# Patient Record
Sex: MALE | Race: WHITE | NOT HISPANIC OR LATINO | Employment: OTHER
[De-identification: names, ages, dates, MRNs, and addresses within clinical notes are randomized per-mention and may not be internally consistent; named-entity substitution may affect disease eponyms.]

---

## 2021-11-29 ENCOUNTER — TRANSCRIBE ORDERS (OUTPATIENT)
Dept: OTHER | Age: 71
End: 2021-11-29
Payer: MEDICARE

## 2021-11-29 DIAGNOSIS — L63.1 ALOPECIA UNIVERSALIS: Primary | ICD-10-CM

## 2021-12-12 ENCOUNTER — HEALTH MAINTENANCE LETTER (OUTPATIENT)
Age: 71
End: 2021-12-12

## 2022-02-26 NOTE — TELEPHONE ENCOUNTER
FUTURE VISIT INFORMATION      FUTURE VISIT INFORMATION:    Date: 5.24.22    Time: 3:30    Location: [facility]  REFERRAL INFORMATION:    Referring provider:  Dr. Dinorah Monzon    Referring providers clinic:  [facility] Dermatology    Reason for visit/diagnosis  Hair loss- total/ Referral received, not processed yet- Dr Nicolás [facility]/ Scheduled per Pt    RECORDS REQUESTED FROM:       Clinic name Comments Records Status   [facility] 2.22.22, 1.25.22, 1.4.22 + more with  Dr. Monzon CE

## 2022-05-24 ENCOUNTER — LAB (OUTPATIENT)
Dept: LAB | Facility: CLINIC | Age: 72
End: 2022-05-24

## 2022-05-24 ENCOUNTER — OFFICE VISIT (OUTPATIENT)
Dept: DERMATOLOGY | Facility: CLINIC | Age: 72
End: 2022-05-24
Payer: MEDICARE

## 2022-05-24 ENCOUNTER — PRE VISIT (OUTPATIENT)
Dept: DERMATOLOGY | Facility: CLINIC | Age: 72
End: 2022-05-24

## 2022-05-24 VITALS — HEART RATE: 74 BPM | DIASTOLIC BLOOD PRESSURE: 89 MMHG | SYSTOLIC BLOOD PRESSURE: 136 MMHG

## 2022-05-24 DIAGNOSIS — Z51.81 MEDICATION MONITORING ENCOUNTER: ICD-10-CM

## 2022-05-24 DIAGNOSIS — Z79.899 OTHER LONG TERM (CURRENT) DRUG THERAPY: ICD-10-CM

## 2022-05-24 DIAGNOSIS — Z11.59 ENCOUNTER FOR SCREENING FOR OTHER VIRAL DISEASES: ICD-10-CM

## 2022-05-24 DIAGNOSIS — L63.1 ALOPECIA UNIVERSALIS: ICD-10-CM

## 2022-05-24 DIAGNOSIS — L60.3 ONYCHODYSTROPHY: ICD-10-CM

## 2022-05-24 DIAGNOSIS — L63.1 ALOPECIA UNIVERSALIS: Primary | ICD-10-CM

## 2022-05-24 LAB
ALBUMIN SERPL-MCNC: 4.2 G/DL (ref 3.4–5)
ALBUMIN UR-MCNC: NEGATIVE MG/DL
ALP SERPL-CCNC: 91 U/L (ref 40–150)
ALT SERPL W P-5'-P-CCNC: 63 U/L (ref 0–70)
ANION GAP SERPL CALCULATED.3IONS-SCNC: 5 MMOL/L (ref 3–14)
APPEARANCE UR: ABNORMAL
AST SERPL W P-5'-P-CCNC: 36 U/L (ref 0–45)
BASOPHILS # BLD AUTO: 0.1 10E3/UL (ref 0–0.2)
BASOPHILS NFR BLD AUTO: 2 %
BILIRUB SERPL-MCNC: 0.4 MG/DL (ref 0.2–1.3)
BILIRUB UR QL STRIP: NEGATIVE
BUN SERPL-MCNC: 19 MG/DL (ref 7–30)
CALCIUM SERPL-MCNC: 9.7 MG/DL (ref 8.5–10.1)
CHLORIDE BLD-SCNC: 108 MMOL/L (ref 94–109)
CHOLEST SERPL-MCNC: 137 MG/DL
CO2 SERPL-SCNC: 30 MMOL/L (ref 20–32)
COLOR UR AUTO: YELLOW
CREAT SERPL-MCNC: 1.1 MG/DL (ref 0.66–1.25)
EOSINOPHIL # BLD AUTO: 0.6 10E3/UL (ref 0–0.7)
EOSINOPHIL NFR BLD AUTO: 9 %
ERYTHROCYTE [DISTWIDTH] IN BLOOD BY AUTOMATED COUNT: 12.6 % (ref 10–15)
FASTING STATUS PATIENT QL REPORTED: NO
GFR SERPL CREATININE-BSD FRML MDRD: 73 ML/MIN/1.73M2
GLUCOSE BLD-MCNC: 100 MG/DL (ref 70–99)
GLUCOSE UR STRIP-MCNC: NEGATIVE MG/DL
HCT VFR BLD AUTO: 50.3 % (ref 40–53)
HDLC SERPL-MCNC: 38 MG/DL
HGB BLD-MCNC: 16.9 G/DL (ref 13.3–17.7)
HGB UR QL STRIP: NEGATIVE
IMM GRANULOCYTES # BLD: 0 10E3/UL
IMM GRANULOCYTES NFR BLD: 0 %
KETONES UR STRIP-MCNC: 5 MG/DL
LDLC SERPL CALC-MCNC: 67 MG/DL
LEUKOCYTE ESTERASE UR QL STRIP: NEGATIVE
LYMPHOCYTES # BLD AUTO: 1.6 10E3/UL (ref 0.8–5.3)
LYMPHOCYTES NFR BLD AUTO: 24 %
MCH RBC QN AUTO: 30.7 PG (ref 26.5–33)
MCHC RBC AUTO-ENTMCNC: 33.6 G/DL (ref 31.5–36.5)
MCV RBC AUTO: 92 FL (ref 78–100)
MONOCYTES # BLD AUTO: 0.6 10E3/UL (ref 0–1.3)
MONOCYTES NFR BLD AUTO: 8 %
MUCOUS THREADS #/AREA URNS LPF: PRESENT /LPF
NEUTROPHILS # BLD AUTO: 3.8 10E3/UL (ref 1.6–8.3)
NEUTROPHILS NFR BLD AUTO: 57 %
NITRATE UR QL: NEGATIVE
NONHDLC SERPL-MCNC: 99 MG/DL
NRBC # BLD AUTO: 0 10E3/UL
NRBC BLD AUTO-RTO: 0 /100
PH UR STRIP: 5 [PH] (ref 5–7)
PLATELET # BLD AUTO: 277 10E3/UL (ref 150–450)
POTASSIUM BLD-SCNC: 4.5 MMOL/L (ref 3.4–5.3)
PROT SERPL-MCNC: 7.8 G/DL (ref 6.8–8.8)
RBC # BLD AUTO: 5.5 10E6/UL (ref 4.4–5.9)
RBC URINE: 1 /HPF
SODIUM SERPL-SCNC: 143 MMOL/L (ref 133–144)
SP GR UR STRIP: 1.02 (ref 1–1.03)
TRIGL SERPL-MCNC: 159 MG/DL
UROBILINOGEN UR STRIP-MCNC: 2 MG/DL
WBC # BLD AUTO: 6.7 10E3/UL (ref 4–11)
WBC URINE: 2 /HPF

## 2022-05-24 PROCEDURE — 86481 TB AG RESPONSE T-CELL SUSP: CPT | Performed by: DERMATOLOGY

## 2022-05-24 PROCEDURE — 85025 COMPLETE CBC W/AUTO DIFF WBC: CPT | Performed by: PATHOLOGY

## 2022-05-24 PROCEDURE — 86704 HEP B CORE ANTIBODY TOTAL: CPT | Performed by: DERMATOLOGY

## 2022-05-24 PROCEDURE — 80053 COMPREHEN METABOLIC PANEL: CPT | Performed by: PATHOLOGY

## 2022-05-24 PROCEDURE — 81001 URINALYSIS AUTO W/SCOPE: CPT | Performed by: PATHOLOGY

## 2022-05-24 PROCEDURE — 86706 HEP B SURFACE ANTIBODY: CPT | Performed by: DERMATOLOGY

## 2022-05-24 PROCEDURE — 99204 OFFICE O/P NEW MOD 45 MIN: CPT | Mod: GC | Performed by: DERMATOLOGY

## 2022-05-24 PROCEDURE — 80061 LIPID PANEL: CPT | Performed by: PATHOLOGY

## 2022-05-24 PROCEDURE — 87389 HIV-1 AG W/HIV-1&-2 AB AG IA: CPT | Performed by: DERMATOLOGY

## 2022-05-24 PROCEDURE — 86803 HEPATITIS C AB TEST: CPT | Performed by: DERMATOLOGY

## 2022-05-24 PROCEDURE — 87340 HEPATITIS B SURFACE AG IA: CPT | Performed by: DERMATOLOGY

## 2022-05-24 PROCEDURE — 36415 COLL VENOUS BLD VENIPUNCTURE: CPT | Performed by: PATHOLOGY

## 2022-05-24 RX ORDER — ATORVASTATIN CALCIUM 40 MG/1
40 TABLET, FILM COATED ORAL DAILY
COMMUNITY
Start: 2022-05-18

## 2022-05-24 RX ORDER — LEVOTHYROXINE SODIUM 25 UG/1
25 TABLET ORAL DAILY
COMMUNITY
Start: 2022-03-29

## 2022-05-24 ASSESSMENT — PAIN SCALES - GENERAL: PAINLEVEL: NO PAIN (0)

## 2022-05-24 NOTE — NURSING NOTE
Dermatology Rooming Note    Jalen Garcia's goals for this visit include:   Chief Complaint   Patient presents with     Hair Loss     Redd is here for hair loss.    Alecia Moore, Visit Facilitator

## 2022-05-24 NOTE — LETTER
5/24/2022       RE: J[place]  [address] [address] [facility] [address]     Dear Colleague,    Thank you for referring your patient, Jalen Garcia, to the [facility] DERMATOLOG[facility] [place] [facility]. Please see a copy of my visit note below.    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 His clinical hx is consistent with alopecia universalis. Patient has failed treatment with squaric acid dibutylester contact sensitization therapy.  Discussed the pathophysiology and extensively explained the risks and benefits of oral treatment options and recommended considering a janus kinase inhibitor such as Xeljanz. Counseled that we would need to obtain labs prior to initiating this and that it may take 3-6 months to see improvement.  - Baseline labs ordered today  - Reviewed outside derm records from [facility] over the past 10 months  - Reviewed CBC diff, CMP, TSH, JOSEPH, RF, peripheral smear, ESR, CRP, CXR  - Plan to start oral Xeljanz once labs return normal. Advised pt to communicate with his PCP and other specialists to ensure on the same page     Procedures Performed:   None    Follow-up: 4 month(s) in-person, or earlier for  new or changing lesions    Staff and Scribe:       Rachana Saleem MD  PGY-5 Medicine-Dermatology  Pager [phone]      Scribe Disclosure:  I, Hanny Saini, am serving as a scribe to document services personally performed by Chanel Marie MD based on data collection and the provider's statements to me.     Provider Disclosure:   The documentation recorded by the scribe accurately reflects the services I personally performed and the decisions made by me.    Patient was seen and examined with the medicine/dermatology resident. I agree with the history, review of systems, physical examination, assessments and plan.    Chanel Marie MD  Professor and  Chair  Department of Dermatology  [facility]  ____________________________________________    CC: Hair Loss (Redd is here for hair loss. )    HPI:  Mr. Jalen Garcia is a 71 year old male who presents as a new patient for evaluation of hair loss.   - Seen at the request of: Dr. Nicolás Copeland  - Reason for referral: alopecia universalis     - Onset of hair loss: Aug 2021  - Affected areas: whole body  - Shedding or thinning, or both: shedding  - Percentage of hair loss: 100%  No Scalp pain   No Scalp burning   No Scalp itching    Yes - complete loss Eyebrow changes    Yes - complete loss Eyelash changes   Yes - complete loss Beard changes    Yes - loss of pubic, axillary, trunk, arm/leg hair Other body hair changes    Yes - onychodystrophy all 10 nails Nail changes    No Additional symptoms? (please list below)     - Current treatments: squaric acid sensitiziation (completed 17 treatments)  - Prior discontinued treatments: N/A  - Prior biopsies: N/A (records available: yes)  - Prior labs: reviewed, see below (records available: yes )  - Scalp or hair care habits/products: washes scalp daily  - - - Sunscreen use on face or scalp? If so, what brand? 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 Active smoker. He does report intermittent flushing of unclear etiology. Does not sweat. He has had painful crumbling of all his fingernails    Patient is otherwise feeling well, in usual state of health, and has no additional skin concerns today.     Labs:  CBC , CMP , Hepatic panel  and TSH reviewed.  CXR within normal limits from 3/2022  CBC within normal limits  ESR/CRP within normal limits  RF negative  UA negative  PSA within normal limits  JOSEPH within normal limits   Peripheral smear  Elevated TSH, on levothyroxine    Physical Exam:  Vitals: /89   Pulse 74   GEN: Well developed, well-nourished, in no acute distress, in a pleasant mood.    SKIN: Focused examination of the scalp and nails was performed.  - diffuse fine vellus hair fibers < 1 mm on scalp  - Scattered eyelash fibers  - No eyebrow fibers  - nail dystrophy 10/10 fingernails  - No scalp folliculitis/pustules   - No other lesions of concern on areas examined.     Medications:  Current Outpatient Medications   Medication     aspirin (ASA) 81 MG EC tablet     atorvastatin (LIPITOR) 40 MG tablet     cholecalciferol (VITAMIN D3) 25 mcg (1000 units) capsule     levothyroxine (SYNTHROID/LEVOTHROID) 25 MCG tablet     UNABLE TO FIND     No current facility-administered medications for this visit.      Past  Medical History:   There is no problem list on file for this patient.    No past medical history on file.    CC Referred Self, MD  No address on file on close of this encounter.        Again, thank you for allowing me to participate in the care of your patient.      Sincerely,    Chanel Marie MD

## 2022-05-24 NOTE — PROGRESS NOTES
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 His clinical hx is consistent with alopecia universalis. Patient has failed treatment with squaric acid dibutylester contact sensitization therapy.  Discussed the pathophysiology and extensively explained the risks and benefits of oral treatment options and recommended considering a janus kinase inhibitor such as Xeljanz. Counseled that we would need to obtain labs prior to initiating this and that it may take 3-6 months to see improvement.  - Baseline labs ordered today  - Reviewed outside derm records from  over the past 10 months  - Reviewed CBC diff, CMP, TSH, JOSEPH, RF, peripheral smear, ESR, CRP, CXR  - Plan to start oral Xeljanz once labs return normal. Advised pt to communicate with his PCP and other specialists to ensure on the same page     Procedures Performed:   None    Follow-up: 4 month(s) in-person, or earlier for new or changing lesions    Staff and Scribe:       Rachana Saleem MD  PGY-5 Medicine-Dermatology  Pager [phone]      Scribe Disclosure:  I, Hanny Saini, am serving as a scribe to document services personally performed by Chanel Marie MD based on data collection and the provider's statements to me.      Provider Disclosure:   The documentation recorded by the scribe accurately reflects the services I personally performed and the decisions made by me.    Patient was seen and examined with the medicine/dermatology resident. I agree with the history, review of systems, physical examination, assessments and plan.    Chanel Marie MD  Professor and  Chair  Department of Dermatology  [facility]  ____________________________________________    CC: Hair Loss (Redd is here for hair loss. )    HPI:  Mr. Jalen Garcia is a 71 year old male who presents as a new patient for evaluation of hair loss.   - Seen at the request of: Dr. Nicolás Copeland  - Reason for referral: alopecia universalis     - Onset of hair loss: Aug 2021  - Affected areas: whole body  - Shedding or thinning, or both: shedding  - Percentage of hair loss: 100%  No Scalp pain   No Scalp burning   No Scalp itching    Yes - complete loss Eyebrow changes    Yes - complete loss Eyelash changes   Yes - complete loss Beard changes    Yes - loss of pubic, axillary, trunk, arm/leg hair Other body hair changes    Yes - onychodystrophy all 10 nails Nail changes    No Additional symptoms? (please list below)     - Current treatments: squaric acid sensitiziation (completed 17 treatments)  - Prior discontinued treatments: N/A  - Prior biopsies: N/A (records available: yes)  - Prior labs: reviewed, see below (records available: yes )  - Scalp or hair care habits/products: washes scalp daily  - - - Sunscreen use on face or scalp? If so, what brand? 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 Active smoker. He does report intermittent flushing of unclear etiology. Does not sweat. He has had painful crumbling of all his fingernails    Patient is otherwise feeling well, in usual state of health, and has no additional skin concerns today.     Labs:  CBC , CMP , Hepatic panel  and TSH reviewed.  CXR within normal limits from 3/2022  CBC within normal limits  ESR/CRP within normal limits  RF negative  UA negative  PSA within normal limits  JOSEPH within normal limits   Peripheral smear  Elevated TSH, on levothyroxine    Physical Exam:  Vitals: /89   Pulse 74   GEN: Well developed, well-nourished, in no acute distress, in a pleasant mood.    SKIN: Focused examination of the scalp and nails was performed.  - diffuse fine vellus hair fibers < 1 mm on scalp  - Scattered eyelash fibers  - No eyebrow fibers  - nail dystrophy 10/10 fingernails  - No scalp folliculitis/pustules   - No other lesions of concern on areas examined.     Medications:  Current Outpatient Medications   Medication     aspirin (ASA) 81 MG EC tablet     atorvastatin (LIPITOR) 40 MG tablet     cholecalciferol (VITAMIN D3) 25 mcg (1000 units) capsule     levothyroxine (SYNTHROID/LEVOTHROID) 25 MCG tablet     UNABLE TO FIND     No current facility-administered medications for this visit.      Past Medical History:   There is no problem list on file for this patient.    No past medical history on file.    CC Referred Self, MD  No address on file on close of this encounter.

## 2022-05-25 LAB
HBV CORE AB SERPL QL IA: NONREACTIVE
HBV SURFACE AB SERPL IA-ACNC: 0.27 M[IU]/ML
HBV SURFACE AG SERPL QL IA: NONREACTIVE
HCV AB SERPL QL IA: NONREACTIVE
HIV 1+2 AB+HIV1 P24 AG SERPL QL IA: NONREACTIVE

## 2022-05-26 LAB
GAMMA INTERFERON BACKGROUND BLD IA-ACNC: 0.06 IU/ML
M TB IFN-G BLD-IMP: NEGATIVE
M TB IFN-G CD4+ BCKGRND COR BLD-ACNC: 9.94 IU/ML
MITOGEN IGNF BCKGRD COR BLD-ACNC: 0.02 IU/ML
MITOGEN IGNF BCKGRD COR BLD-ACNC: 0.02 IU/ML
QUANTIFERON MITOGEN: 10 IU/ML
QUANTIFERON NIL TUBE: 0.06 IU/ML
QUANTIFERON TB1 TUBE: 0.08 IU/ML
QUANTIFERON TB2 TUBE: 0.08

## 2022-06-13 ENCOUNTER — TELEPHONE (OUTPATIENT)
Dept: DERMATOLOGY | Facility: CLINIC | Age: 72
End: 2022-06-13

## 2022-06-13 NOTE — TELEPHONE ENCOUNTER
PA Initiation    Medication: Xeljanz  Insurance Company: [employer] - Phone [phone] Fax [phone]  Pharmacy Filling the Rx: [facility] - [place], [place] - [facility] [address]  Filling Pharmacy Phone:    Filling Pharmacy Fax:    Start Date: 6/13/2022

## 2022-06-15 NOTE — TELEPHONE ENCOUNTER
PRIOR AUTHORIZATION DENIED    Medication: Xeljanz    Denial Date: 6/13/2022    Denial Rational: Not approved diagnosis    Appeal Information: fax [phone]

## 2022-06-20 NOTE — TELEPHONE ENCOUNTER
Chanel Marie MD Devore, Kindra, [facility]; Veronica Kraft; [facility] Dermatology Adult [facility] 2 days ago     SIDNEY Martin, can we try xelsource?   Alexandra and team, can you print out the letter and will add to the pile of xelganz denials and will be ready to write if xelsource is denied.   Regards,   SIDNEY

## 2022-06-21 ENCOUNTER — TELEPHONE (OUTPATIENT)
Dept: DERMATOLOGY | Facility: CLINIC | Age: 72
End: 2022-06-21
Payer: MEDICARE

## 2022-06-28 NOTE — TELEPHONE ENCOUNTER
"Per Dr. Dodson: \"RESULTS ARE OK BUT THERE IS NO ANTIBODY TO HEP B AND TRIGLYCERIDES ARE ELEVATED - MR. MARTINEZ WAS RECOMMENDED TO SEE HIS PRIMARY CARE PROVIDER TO REVIEW THESE RESULTS AND NEXT STEPS PRIOR TO STARTING XELGANZ\"    Patient reports he had the Heb B vaccine 2 days after his visit with Dr. Marie and is on a medication for his triglycerides via PCP. He received a letter from insurance saying they will not cover Xelganz but I do not see any PA initiation from us. Will route to PA team to see if they have any ideas. Patient is wondering how much it would be to pay out of pocket. Routing to Dr. Marie to recommend alternatives if this is not covered.    Estrella Dos Santos, EMT   "

## 2022-06-28 NOTE — TELEPHONE ENCOUNTER
Medication Appeal Initiation    We have initiated an appeal for the requested medication:  Medication: Xeljanz  Appeal Start Date:  6/28/2022  Insurance Company: [employer] - Phone [phone] Fax [phone]  Comments:  Faxed [phone]

## 2022-06-28 NOTE — TELEPHONE ENCOUNTER
[facility] Call Center    Phone Message    May a detailed message be left on voicemail: yes     Reason for Call: Medication Question or concern regarding medication   Prescription Clarification  Name of Medication: Xeljanz  Prescribing Provider: Dr. Marie   Pharmacy: [facility] [address] [address]    What on the order needs clarification? Pt is still wait to hear about the Rx. If it is not getting covered please call to discuss out of pocket pay.   Thanks           Action Taken: Message routed to:  [facility] Derm    Travel Screening: Not Applicable

## 2022-06-28 NOTE — TELEPHONE ENCOUNTER
Veronica Kraft  [facility] Dermatology Adult [facility] 2 minutes ago (10:42 AM)     [place]    Please see documentation below. Appeal has been sent to plan today.     Thank you   Veronica Kraft   Pharmacy Liaison Dermatology   P: [phone]   F: [phone]   [email or web address]    Message text

## 2022-06-30 ENCOUNTER — MYC MEDICAL ADVICE (OUTPATIENT)
Dept: PHARMACY | Facility: OTHER | Age: 72
End: 2022-06-30

## 2022-06-30 NOTE — TELEPHONE ENCOUNTER
MEDICATION APPEAL DENIED    Medication: Xeljanz    Denial Date: 6/28/2022    Denial Rational: not covered indication    Second Level Appeal Information: [phone]

## 2022-06-30 NOTE — TELEPHONE ENCOUNTER
2nd level Medication Appeal Initiation    We have initiated an appeal for the requested medication:  Medication: Xeljanz  Appeal Start Date:  6/30/2022  Insurance Company: [employer] - Phone [phone] Fax [phone]  Comments:  Faxed [phone]

## 2022-07-15 NOTE — TELEPHONE ENCOUNTER
2nd level MEDICATION APPEAL DENIED    Medication: Xeljanz    Denial Date: 7/14/2022    Denial Rational: not covered indication

## 2022-07-15 NOTE — TELEPHONE ENCOUNTER
All appeal levels have been exhausted. Check with patient on free drug and he states he will not qualify. Please follow up with patient on next steps and discontinue medication. Closing encoutner

## 2022-07-18 NOTE — TELEPHONE ENCOUNTER
Called and left a message for the patient to return my call. Dr Marie want to know if he followed up with his PCP on hi elevated cholesterol and what steps his Doctor is taking to manage it.     Loki Hall, Paramedic

## 2022-07-18 NOTE — TELEPHONE ENCOUNTER
Spoke with Jamal, there was confusion about reaching out to his PCP about his triglycerides - he thought Dr. Marie was reaching out to the PCP. I explained that he needs to reach out to his PCP to discuss his elevated numbers. Jamal will reach out to his PCP and send us a MyChart message once this has been addressed.

## 2022-07-18 NOTE — TELEPHONE ENCOUNTER
Chanel Marie MD Flores, Marc; [facility] Dermatology Adult [facility] 2 days ago     SIDNEY Larry,   First, can you check if Rao followed up on the elevated triglycerides and other lab studies?  If yes, please find out what was recommended.     Second, we can order baracitinib and see if his insurance will cover this NATALIA inhibitor. [place] doing once #1 has been addressed.     Regards,   SIDNEY

## 2022-07-19 ENCOUNTER — MYC MEDICAL ADVICE (OUTPATIENT)
Dept: DERMATOLOGY | Facility: CLINIC | Age: 72
End: 2022-07-19

## 2022-07-24 DIAGNOSIS — L63.9 ALOPECIA AREATA: Primary | ICD-10-CM

## 2022-07-24 DIAGNOSIS — M35.9 AUTOIMMUNE DISEASE (H): ICD-10-CM

## 2022-07-24 NOTE — CONFIDENTIAL NOTE
Follow-up on prescribing baricitinib. Question about Rao's triglycerides has been answerd. Will go ahead and order baricitinib 4 mg daily. Follow-up visit pending at the [facility]. Should be in about 2 months.    Chanel Marie MD

## 2022-07-25 ENCOUNTER — TELEPHONE (OUTPATIENT)
Dept: DERMATOLOGY | Facility: CLINIC | Age: 72
End: 2022-07-25

## 2022-07-25 NOTE — TELEPHONE ENCOUNTER
Chanel Marie MD  You Yesterday (8:48 AM)     MH    This has been addressed in his chart. Fyi only       Message text

## 2022-07-25 NOTE — TELEPHONE ENCOUNTER
PA Initiation    Medication: Olumiant  Insurance Company: [employer] - Phone [phone] Fax [phone]  Pharmacy Filling the Rx: [facility] - [place], [place] - [facility] [address]  Filling Pharmacy Phone:    Filling Pharmacy Fax:    Start Date: 7/25/2022

## 2022-07-26 NOTE — TELEPHONE ENCOUNTER
Dr. Marie responded to patient on 7/24/2022. See 7/24/2022 MyChart Encounter from 7/24/2022 for more information. Appears that patient has no further questions. Closing this encounter.     Follow up pending at  site. Should be in about 2 months per MD. Patient on Dr. Marie's  waitlist.    PA for Olumiant in process at time of writing. See Telephone encounter from 7/25/2022 for more information.    José Luis Pablo, EMT

## 2022-07-27 NOTE — TELEPHONE ENCOUNTER
PRIOR AUTHORIZATION DENIED    Medication: Olumiant    Denial Date: 7/25/2022    Denial Rational: Drugs used for cosmetic purposes are excluded from part D coverage    Appeal Information: [phone]

## 2022-08-01 NOTE — TELEPHONE ENCOUNTER
Veronica, what is the next step  - do we do a second appeal? schedule a peer to peer? other?  Received: Yesterday  Chanel Marie MD Shutrop, Kayla; P [facility] Dermatology Adult [facility]  Caller: Unspecified (1 week ago,  9:15 AM)  Hi,   As in the subject line -   Please advise.   Regards,

## 2022-08-01 NOTE — TELEPHONE ENCOUNTER
Veronica Kraft, Chanel Wadsworth MD; [facility] Dermatology Adult [facility] 3 hours ago (9:33 AM)     [place]    Hello     We can appeal the decision, however would need a letter of medical necessity since insurance is denying because its being used for cosmetic purposes     Thank you   Veronica

## 2022-08-22 NOTE — TELEPHONE ENCOUNTER
Medication Appeal Initiation    We have initiated an appeal for the requested medication:  Medication: Olumiant  Appeal Start Date:  8/22/2022  Insurance Company: [employer] - Phone [phone] Fax [phone]  Comments:  Faxed [phone]

## 2022-08-24 NOTE — TELEPHONE ENCOUNTER
MEDICATION APPEAL DENIED    Medication: Olumiant    Denial Date: 7/25/2022    Denial Rational: denial upheld, drugs used for cosmetic purposes are excluded from part D coveage    Appeal Information: [facility]  [phone]

## 2022-08-24 NOTE — TELEPHONE ENCOUNTER
2nd Level Medication Appeal Initiation    We have initiated an appeal for the requested medication:  Medication: Olumiant  Appeal Start Date:  8/22/2022  Insurance Company: [employer] - Phone [phone] Fax [phone]  Comments:  Faxed [phone]

## 2022-08-26 NOTE — TELEPHONE ENCOUNTER
Per Hanny at [facility] the appeal decision is unfavorable and a written detailed letter will be faxed over.

## 2022-08-31 ENCOUNTER — TELEPHONE (OUTPATIENT)
Dept: DERMATOLOGY | Facility: CLINIC | Age: 72
End: 2022-08-31

## 2022-08-31 NOTE — TELEPHONE ENCOUNTER
Lvm letting pt know that  would like to touch base with him. Pt was scheduled for July of next year. Sent a message to schedule for this year in [place].  ----- Message from Chanel Marie MD sent at 8/28/2022  6:33 AM CDT -----  Regarding: RE: Appointment  HI,  Would be good to touch bases before winter. If an appointment is scheduled, would keep. Otherwise, could go out into October or November.  Regards,        ----- Message -----  From: Lashawn Jiemnes  Sent: 8/26/2022   3:33 PM CDT  To: Chanel Marie MD, #  Subject: Appointment                                      Hello ,    I called this patient to schedule him for MG. Pt stated that his insurance denied the medication prescribed so there was nothing done within the time he was out of the office with you to now.     He was wondering if you'd still need to see him in 4 months (in September). He would still like to see you in [place].     Please advise,  Lashawn Jimenes

## 2022-09-02 NOTE — TELEPHONE ENCOUNTER
MEDICATION APPEAL DENIED    Medication: Olumiant    Denial Date:  9/2/2022    Denial Rational: Spoke to [facility] the appeal was denied still because its being used for cosmetic purposes and will not be covered under part d    Second Level Appeal Information: Per insurance the only option you can try is medical billing but Part B does not cover this medication.    Second level appeals will be managed by the clinic staff and provider. Please contact the [employer] Prior Authorization Team if additional information about the denial is needed.

## 2022-09-06 NOTE — TELEPHONE ENCOUNTER
Spoke to pt.  Pt states that he does make too much money for a free drug program.  He mentioned he did do a program where he paid like 20% less by calling a pharmacy.  I did check "[employer] prices and they would charge $600 less then our cash price with [employer] $3,172.69 compared to $2,547.80so that could be an option as well.  Unfortunately, we did exhaust all our insurance based options to get it covered.

## 2022-09-19 NOTE — TELEPHONE ENCOUNTER
Chanel Marie MD  You; Ede Wheeler 23 hours ago (10:47 AM)     [facility],   I will be seeing him in follow0-up at the end of this month and will plan to catch up with him then in regards to next steps.   MH    Message

## 2022-09-22 NOTE — PROGRESS NOTES
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 Has been present for 13 months and started \"eveywhere\" . Patient denies pain but with mild itching.  Patient has a family history of AA and hypothyroidism, was recently diagnosed with thyroid disease himself.  Has failed treatment with squaric acid dibutylester contact sensitization therapy. Insurance declined janus kinase inhibitors. Counseled patient on alternative treatments including methotrexate, prednisone, low dose oral minoxidil, and ILK injections. Reviewed methotrexate -  will required blood safety testing and monitoring. Explained that ILK injections will require multiple treatments and will have no improvement of hair loss on eyelashes and eyebrows. Patient declined ILK injections as he wants to improved eyelashes and eyebrows as well. Counseled patient on the effects and risks of prednisone including change in blood pressure, bone, and eyes with long term use. Review on adding oral low dose minoxidil after a course of prednisone.   - Advised patient to communicate with his eye doctor and PCP for prednisone.   - Recommend a tapering course of prednisone:  40 mg daily x 7 days, then 30 mg x 7 days, then 20 mg x 7 days, then 10 mg daily. Discussed medication side effects including increased appetite, weight gain, edema, hypertension, elevated blood sugar levels, changes in mood, osteopenia/osteoporosis, Cushing's disease and risks associated with abruptly stopping the medication.Take medication with food and wait 30 minutes before laying down.    - Recommend Head&Shoulder shampoo for maintenance after starting prednisone.     # Light " patches on the buccal - mouth   - pending consult every 6 months with oral surgeon   - recommend to stop smoking    Procedures Performed:   None     Follow-up: 5-6 weeks hair loss.     Staff and Scribe:     Scribe Disclosure:   I, Taras Hartman, am serving as a scribe to document services personally performed by this physician, Dr. Chanel Marie, based on data collection and the provider's statements to me.     Provider Disclosure:   The documentation recorded by the scribe accurately reflects the services I personally performed and the decisions made by me.    Chanel Marie MD  Professor and Chair  Department of Dermatology  [facility]: Phone: [phone], Fax:[phone]  [facility]: Phone: [phone], Fax: [phone]      ____________________________________________    CC: Derm Problem (Hair loss)    HPI:  Mr. Jalen Garcia is a 72 year old male who presents as a return patient for follow-up of hair loss, diagnosed as extensive alopecia areata, alopecia universalis..   - Last seen in-clinic on 5/24/22.   - Shedding or thinning, or both: None   - Current tx: nothing   - Scalp or hair care habits/products: wife's shampoo   No Any new medications, supplements, or products? (please list below)     No Scalp pain   No Scalp burning   Mild Scalp itching    No Eyebrow changes    No Eyelash changes   No Beard changes    No Other body hair changes    No Nail changes    No Additional symptoms? (please list below)     - Overall course: patient denies seasonal allergy. Currently taking the multivitamin, fish oil, aspirin, and the thyroxine 50 mcg. Currently showers 5 times a week. Patient notes male pattern hairloss on the frontal scalp. Patient notes improvement with hair transplant in [place], but it does not cure the hair loss.     Patient is otherwise feeling well, in usual state of  health, and has no additional skin concerns today.     Labs:  None reviewed.    Physical Exam:  Vitals: There were no vitals taken for this visit.  GEN: Well developed, well-nourished, in no acute distress, in a pleasant mood.    SKIN: Focused examination of scalp, face, mouth, and nails was performed.  - Fine vellus fibers on the vertex scalp.    - No significant hair growth on the eyelashes and eyebrows.   - Vertical ridging on the toenails  - Red lunula on the left thumb nail   - No other lesions of concern on areas examined.     Medications:  Current Outpatient Medications   Medication     aspirin (ASA) 81 MG EC tablet     atorvastatin (LIPITOR) 40 MG tablet     Baricitinib 4 MG TABS     cholecalciferol (VITAMIN D3) 25 mcg (1000 units) capsule     levothyroxine (SYNTHROID/LEVOTHROID) 25 MCG tablet     tofacitinib (XELJANZ) 5 MG tablet     UNABLE TO FIND     No current facility-administered medications for this visit.      Past Medical History:   There is no problem list on file for this patient.    No past medical history on file.    CC Referred Self, MD  No address on file on close of this encounter.

## 2022-09-27 ENCOUNTER — TELEPHONE (OUTPATIENT)
Dept: DERMATOLOGY | Facility: CLINIC | Age: 72
End: 2022-09-27

## 2022-09-27 ENCOUNTER — OFFICE VISIT (OUTPATIENT)
Dept: DERMATOLOGY | Facility: CLINIC | Age: 72
End: 2022-09-27
Payer: MEDICARE

## 2022-09-27 VITALS — DIASTOLIC BLOOD PRESSURE: 87 MMHG | SYSTOLIC BLOOD PRESSURE: 136 MMHG | WEIGHT: 186.3 LBS

## 2022-09-27 DIAGNOSIS — M35.9 AUTOIMMUNE DISEASE (H): ICD-10-CM

## 2022-09-27 DIAGNOSIS — L65.9 LOSS OF HAIR: Primary | ICD-10-CM

## 2022-09-27 DIAGNOSIS — L63.1 ALOPECIA UNIVERSALIS: ICD-10-CM

## 2022-09-27 PROCEDURE — 99214 OFFICE O/P EST MOD 30 MIN: CPT | Performed by: DERMATOLOGY

## 2022-09-27 RX ORDER — PREDNISONE 10 MG/1
TABLET ORAL
Qty: 100 TABLET | Refills: 1 | Status: SHIPPED | OUTPATIENT
Start: 2022-09-27 | End: 2023-03-05

## 2022-09-27 NOTE — NURSING NOTE
Jalen Garcia's goals for this visit include:   Chief Complaint   Patient presents with     Derm Problem     Hair loss       He requests these members of his care team be copied on today's visit information: no    PCP: Giorgio Morales    Referring Provider:  Referred Self, MD  No address on file    There were no vitals taken for this visit.    Do you need any medication refills at today's visit? No    Ayse Plunkett LPN

## 2022-09-27 NOTE — LETTER
"    9/27/2022         RE: Jalen Garcia  [id number] [address] [place] [address]        Dear Colleague,    Thank you for referring your patient, Jalen Garcia, to the [facility]. Please see a copy of my visit note below.    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 Has been present for 13 months and started \"eveywhere\" . Patient denies pain but with mild itching.  Patient has a family history of AA and hypothyroidism, was recently diagnosed with thyroid disease himself.  Has failed treatment with squaric acid dibutylester contact sensitization therapy. Insurance declined janus kinase inhibitors. Counseled patient on alternative treatments including methotrexate, prednisone, low dose oral minoxidil, and ILK injections. Reviewed methotrexate -  will required blood safety testing and monitoring. Explained that ILK injections will require multiple treatments and will have no improvement of hair loss on eyelashes and eyebrows. Patient declined ILK injections as he wants to improved eyelashes and eyebrows as well. Counseled patient on the effects and risks of prednisone including change in blood pressure, bone, and eyes with long term use. Review on adding oral low dose minoxidil after a course of prednisone.   - Advised patient to communicate with his eye doctor and PCP for prednisone.   - Recommend a tapering course of prednisone:  40 mg daily x 7 days, then 30 mg x 7 days, then 20 mg x 7 days, then 10 mg daily. Discussed medication side effects including increased appetite, weight gain, edema, hypertension, elevated blood sugar levels, changes in mood, " osteopenia/osteoporosis, Cushing's disease and risks associated with abruptly stopping the medication.Take medication with food and wait 30 minutes before laying down.    - Recommend Head&Shoulder shampoo for maintenance after starting prednisone.     # Light patches on the buccal - mouth   - pending consult every 6 months with oral surgeon   - recommend to stop smoking    Procedures Performed:   None     Follow-up: 5-6 weeks hair loss.     Staff and Scribe:     Scribe Disclosure:   I, Taras Hartman, am serving as a scribe to document services personally performed by this physician, Dr. Chanel Marie, based on data collection and the provider's statements to me.     Provider Disclosure:   The documentation recorded by the scribe accurately reflects the services I personally performed and the decisions made by me.    Chanel Marie MD  Professor and Chair  Department of Dermatology  [facility]: Phone: [phone], Fax:[phone]  [facility]: Phone: [phone], Fax: [phone]      ____________________________________________    CC: Derm Problem (Hair loss)    HPI:  Mr. Jalen Garcia is a 72 year old male who presents as a return patient for follow-up of hair loss, diagnosed as extensive alopecia areata, alopecia universalis..   - Last seen in-clinic on 5/24/22.   - Shedding or thinning, or both: None   - Current tx: nothing   - Scalp or hair care habits/products: wife's shampoo   No Any new medications, supplements, or products? (please list below)     No Scalp pain   No Scalp burning   Mild Scalp itching    No Eyebrow changes    No Eyelash changes   No Beard changes    No Other body hair changes    No Nail changes    No Additional symptoms? (please list below)     - Overall course: patient denies seasonal allergy. Currently taking the multivitamin, fish oil, aspirin, and the  thyroxine 50 mcg. Currently showers 5 times a week. Patient notes male pattern hairloss on the frontal scalp. Patient notes improvement with hair transplant in [place], but it does not cure the hair loss.     Patient is otherwise feeling well, in usual state of health, and has no additional skin concerns today.     Labs:  None reviewed.    Physical Exam:  Vitals: There were no vitals taken for this visit.  GEN: Well developed, well-nourished, in no acute distress, in a pleasant mood.    SKIN: Focused examination of scalp, face, mouth, and nails was performed.  - Fine vellus fibers on the vertex scalp.    - No significant hair growth on the eyelashes and eyebrows.   - Vertical ridging on the toenails  - Red lunula on the left thumb nail   - No other lesions of concern on areas examined.     Medications:  Current Outpatient Medications   Medication     aspirin (ASA) 81 MG EC tablet     atorvastatin (LIPITOR) 40 MG tablet     Baricitinib 4 MG TABS     cholecalciferol (VITAMIN D3) 25 mcg (1000 units) capsule     levothyroxine (SYNTHROID/LEVOTHROID) 25 MCG tablet     tofacitinib (XELJANZ) 5 MG tablet     UNABLE TO FIND     No current facility-administered medications for this visit.      Past Medical History:   There is no problem list on file for this patient.    No past medical history on file.    RENETTA Reeves MD  No address on file on close of this encounter.      Again, thank you for allowing me to participate in the care of your patient.        Sincerely,        Chanel Marie MD

## 2022-09-27 NOTE — TELEPHONE ENCOUNTER
Patient was seen in clinic today for hair loss by Dr. Frank MD requesting patient return to clinic in 5 weeks for follow up in [place]. There are currently no openings available. Routing to MD to advise.    Ayse Plunkett LPN

## 2022-10-03 ENCOUNTER — HEALTH MAINTENANCE LETTER (OUTPATIENT)
Age: 72
End: 2022-10-03

## 2022-10-10 NOTE — TELEPHONE ENCOUNTER
Chanel Marie MD sent to Ayse Plunkett LPN  Cc: [facility] Dermatology Adult [place] [facility]  Caller: Unspecified (1 week ago)  10:20 October 25th could work well if that works for [place].     Please confirm .     Regards,

## 2022-10-25 ENCOUNTER — OFFICE VISIT (OUTPATIENT)
Dept: DERMATOLOGY | Facility: CLINIC | Age: 72
End: 2022-10-25
Payer: MEDICARE

## 2022-10-25 VITALS — HEART RATE: 64 BPM | DIASTOLIC BLOOD PRESSURE: 90 MMHG | SYSTOLIC BLOOD PRESSURE: 152 MMHG

## 2022-10-25 DIAGNOSIS — L63.1 ALOPECIA UNIVERSALIS: Primary | ICD-10-CM

## 2022-10-25 DIAGNOSIS — L60.3 ONYCHODYSTROPHY: ICD-10-CM

## 2022-10-25 DIAGNOSIS — L65.9 LOSS OF HAIR: ICD-10-CM

## 2022-10-25 DIAGNOSIS — Z51.81 MEDICATION MONITORING ENCOUNTER: ICD-10-CM

## 2022-10-25 DIAGNOSIS — M35.9 AUTOIMMUNE DISEASE (H): ICD-10-CM

## 2022-10-25 LAB
ALBUMIN SERPL-MCNC: 4.2 G/DL (ref 3.4–5)
ALP SERPL-CCNC: 80 U/L (ref 40–150)
ALT SERPL W P-5'-P-CCNC: 52 U/L (ref 0–70)
ANION GAP SERPL CALCULATED.3IONS-SCNC: <1 MMOL/L (ref 3–14)
AST SERPL W P-5'-P-CCNC: 26 U/L (ref 0–45)
BASOPHILS # BLD AUTO: 0.1 10E3/UL (ref 0–0.2)
BASOPHILS NFR BLD AUTO: 1 %
BILIRUB SERPL-MCNC: 0.6 MG/DL (ref 0.2–1.3)
BUN SERPL-MCNC: 20 MG/DL (ref 7–30)
CALCIUM SERPL-MCNC: 9.5 MG/DL (ref 8.5–10.1)
CHLORIDE BLD-SCNC: 109 MMOL/L (ref 94–109)
CO2 SERPL-SCNC: 31 MMOL/L (ref 20–32)
CREAT SERPL-MCNC: 1.01 MG/DL (ref 0.66–1.25)
EOSINOPHIL # BLD AUTO: 0.1 10E3/UL (ref 0–0.7)
EOSINOPHIL NFR BLD AUTO: 1 %
ERYTHROCYTE [DISTWIDTH] IN BLOOD BY AUTOMATED COUNT: 12.7 % (ref 10–15)
GFR SERPL CREATININE-BSD FRML MDRD: 80 ML/MIN/1.73M2
GLUCOSE BLD-MCNC: 127 MG/DL (ref 70–99)
HCT VFR BLD AUTO: 51.7 % (ref 40–53)
HGB BLD-MCNC: 17.6 G/DL (ref 13.3–17.7)
IMM GRANULOCYTES # BLD: 0 10E3/UL
IMM GRANULOCYTES NFR BLD: 0 %
LYMPHOCYTES # BLD AUTO: 0.8 10E3/UL (ref 0.8–5.3)
LYMPHOCYTES NFR BLD AUTO: 8 %
MCH RBC QN AUTO: 31.8 PG (ref 26.5–33)
MCHC RBC AUTO-ENTMCNC: 34 G/DL (ref 31.5–36.5)
MCV RBC AUTO: 93 FL (ref 78–100)
MONOCYTES # BLD AUTO: 0.3 10E3/UL (ref 0–1.3)
MONOCYTES NFR BLD AUTO: 3 %
NEUTROPHILS # BLD AUTO: 8.6 10E3/UL (ref 1.6–8.3)
NEUTROPHILS NFR BLD AUTO: 87 %
NRBC # BLD AUTO: 0 10E3/UL
NRBC BLD AUTO-RTO: 0 /100
PLATELET # BLD AUTO: 267 10E3/UL (ref 150–450)
POTASSIUM BLD-SCNC: 4.4 MMOL/L (ref 3.4–5.3)
PROT SERPL-MCNC: 7.7 G/DL (ref 6.8–8.8)
RBC # BLD AUTO: 5.54 10E6/UL (ref 4.4–5.9)
SODIUM SERPL-SCNC: 140 MMOL/L (ref 133–144)
WBC # BLD AUTO: 9.8 10E3/UL (ref 4–11)

## 2022-10-25 PROCEDURE — 85025 COMPLETE CBC W/AUTO DIFF WBC: CPT | Performed by: DERMATOLOGY

## 2022-10-25 PROCEDURE — 80053 COMPREHEN METABOLIC PANEL: CPT | Performed by: DERMATOLOGY

## 2022-10-25 PROCEDURE — 36415 COLL VENOUS BLD VENIPUNCTURE: CPT | Performed by: DERMATOLOGY

## 2022-10-25 PROCEDURE — 99214 OFFICE O/P EST MOD 30 MIN: CPT | Performed by: DERMATOLOGY

## 2022-10-25 RX ORDER — PREDNISONE 10 MG/1
TABLET ORAL
Qty: 100 TABLET | Refills: 1 | Status: SHIPPED | OUTPATIENT
Start: 2022-10-25 | End: 2023-03-05

## 2022-10-25 NOTE — PATIENT INSTRUCTIONS
You should check with your primary care physician about low dose oral minoxidil. The dosage we normally use for hair loss is 2.5 mg daily, at most 5 mg daily.     Also, you may want to follow up with a urologist for increased frequency of urination at night.

## 2022-10-25 NOTE — PROGRESS NOTES
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  - have ok from primary care to initiate low dose oral minoxidil at 1.25 mg daily  Current treatment - prednisone taper  - Prior Tx: MELINDA contact sensitization q weekly (12/7/21), trial of ILK 10, unable to get an oral FDA approved NATALIA for alopecia areata covered and income too high for [employer]  # Elevated blood pressure reading on today's visit  - review with primary care and monitor  # Increased urination  - advised to discuss with primary care/urology     ____________________________________________     Assessment & Plan:      # 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 Has been present for 13 months and started \"eveywhere\" . Patient denies pain but with mild itching. Patient notes rritation due to lack of eyebrows, finger and toenails have gotten worse. Patient has a family history of AA and hypothyroidism, was recently diagnosed with thyroid disease himself.  Has failed treatment with squaric acid dibutylester contact sensitization therapy. Insurance declined janus kinase inhibitors. Counseled patient on alternative treatments including methotrexate, prednisone, low dose oral minoxidil, and ILK injections. Reviewed methotrexate -  will required blood safety testing and monitoring. Nor interested in pursuing  ILK injections. Counseled patient on the effects and risks of prednisone including change in blood pressure, bone, and eyes with long term use.He is now on a tapering dose.  Review on adding oral low dose minoxidil after a course of prednisone.   - Nail photographs obtained.   - BP retested today.  - Labs ordered today: CBC with diff, CMP 10.31.22 Lab results reviewed and are fine; message received that OK to begin low dose oral minoxidil. Will order 1.25 mg initially and if tolerated after " "about a month of treatment, will consider increasing to 2.5 mg daily.  - Revised prednisone taper: 20 mg alternating with 15 mg every other day for 1 week, then 20 mg alternating with 10 mg for 1 week, then 20 mg alternating with 5 mg for 1 week, then 20 mg every other day until next visit  - Reviewed medication side effects including increased appetite, weight gain, edema, hypertension, elevated blood sugar levels, changes in mood, osteopenia/osteoporosis, Cushing's disease and risks associated with abruptly stopping the medication.Take medication with food and wait 30 minutes before laying down.    - Follow up with PCP  for polyuria and safety of low dose oral minoxidil.  - Recommended low dose oral minoxidil 1.25  mg for further treatment. No known history of heart of kidney disease.   - Recommend Head & Shoulder shampoo for scalp health maintenance.     Procedures Performed:   None    Follow-up: 6 week(s) in-person, or earlier for new or changing lesions    Staff and Scribe:     Scribe Disclosure:   I, Alton Kiran, am serving as a scribe to document services personally performed by this physician, Dr. Chanel Marie, based on data collection and the provider's statements to me.      Provider Disclosure:   The documentation recorded by the scribe accurately reflects the services I personally performed and the decisions made by me.    Chanel Marie MD  Professor and Chair  Department of Dermatology  [facility]: Phone: [phone], Fax:[phone]  [facility]: Phone: [phone], Fax: [phone]      ____________________________________________    CC: Derm Problem (Hair loss)    HPI:  Mr. Jalen Garcia is a 72 year old male who presents as a return patient for follow-up of hair loss, diagnosed as alopecia universalis.     Today, he notes his scalp turned red and \"was hot\" and " "appeared like sunburn after starting his prednisone course, then stopped after 4-5 days. He admits that he \"feels like he's sweating\" but isn't, may be linked to his thyroid medication. Has has tolerated the  prednisone taper well but with little clinical response.     - Last seen in-clinic on 9/27/22    Patient is otherwise feeling well, in usual state of health.        Labs:  None reviewed.    Physical Exam:  Vitals: BP (!) 154/90   Pulse 66   GEN: Well developed, well-nourished, in no acute distress, in a pleasant mood.    SKIN: Focused examination of scalp,face,  fingernails was performed.  - Fine vellus fivers on the vertex down to mid occipital scalp, persisting from prior with more present on today's examination  - Eyebrows not present.  - Eyelashes not present.  - Periungual erythema is less prominent  - may be the result of the oral prednisone treatment    Medications:  Current Outpatient Medications   Medication     aspirin (ASA) 81 MG EC tablet     atorvastatin (LIPITOR) 40 MG tablet     Baricitinib 4 MG TABS     cholecalciferol (VITAMIN D3) 25 mcg (1000 units) capsule     levothyroxine (SYNTHROID/LEVOTHROID) 25 MCG tablet     predniSONE (DELTASONE) 10 MG tablet     tofacitinib (XELJANZ) 5 MG tablet     UNABLE TO FIND     No current facility-administered medications for this visit.      Past Medical History:   There is no problem list on file for this patient.    No past medical history on file.    CC No referring provider defined for this encounter. on close of this encounter.   "

## 2022-10-25 NOTE — LETTER
"    10/25/2022         RE: Jalen Garcia  [address]  [place] [address]        Dear Colleague,    Thank you for referring your patient, Jalen Garcia, to the [facility]. Please see a copy of my visit note below.    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  - have ok from primary care to initiate low dose oral minoxidil at 1.25 mg daily  Current treatment - prednisone taper  - Prior Tx: MELINDA contact sensitization q weekly (12/7/21), trial of ILK 10, unable to get an oral FDA approved NATALIA for alopecia areata covered and income too high for [employer]  # Elevated blood pressure reading on today's visit  - review with primary care and monitor  # Increased urination  - advised to discuss with primary care/urology     ____________________________________________     Assessment & Plan:      # 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 Has been present for 13 months and started \"eveywhere\" . Patient denies pain but with mild itching. Patient notes rritation due to lack of eyebrows, finger and toenails have gotten worse. Patient has a family history of AA and hypothyroidism, was recently diagnosed with thyroid disease himself.  Has failed treatment with squaric acid dibutylester contact sensitization therapy. Insurance declined janus kinase inhibitors. Counseled patient on alternative treatments including methotrexate, prednisone, low dose oral minoxidil, and ILK injections. Reviewed methotrexate -  will required blood safety testing and monitoring. Nor interested in pursuing  ILK injections. Counseled patient on the effects and risks of prednisone including change in blood pressure, bone, and eyes with long term use.He is now on a tapering dose.  Review on adding oral low dose minoxidil after a course of " prednisone.   - Nail photographs obtained.   - BP retested today.  - Labs ordered today: CBC with diff, CMP 10.31.22 Lab results reviewed and are fine; message received that OK to begin low dose oral minoxidil. Will order 1.25 mg initially and if tolerated after about a month of treatment, will consider increasing to 2.5 mg daily.  - Revised prednisone taper: 20 mg alternating with 15 mg every other day for 1 week, then 20 mg alternating with 10 mg for 1 week, then 20 mg alternating with 5 mg for 1 week, then 20 mg every other day until next visit  - Reviewed medication side effects including increased appetite, weight gain, edema, hypertension, elevated blood sugar levels, changes in mood, osteopenia/osteoporosis, Cushing's disease and risks associated with abruptly stopping the medication.Take medication with food and wait 30 minutes before laying down.    - Follow up with PCP  for polyuria and safety of low dose oral minoxidil.  - Recommended low dose oral minoxidil 1.25  mg for further treatment. No known history of heart of kidney disease.   - Recommend Head & Shoulder shampoo for scalp health maintenance.     Procedures Performed:   None    Follow-up: 6 week(s) in-person, or earlier for new or changing lesions    Staff and Scribe:     Scribe Disclosure:   I, Alton Kiran, am serving as a scribe to document services personally performed by this physician, Dr. Chanel Marie, based on data collection and the provider's statements to me.      Provider Disclosure:   The documentation recorded by the scribe accurately reflects the services I personally performed and the decisions made by me.    Chanel Marie MD  Professor and Chair  Department of Dermatology  [facility]: Phone: [phone], Fax:[phone]  [facility]: Phone: [phone], Fax:  "[phone]      ____________________________________________    CC: Derm Problem (Hair loss)    HPI:  Mr. Jalen Garcia is a 72 year old male who presents as a return patient for follow-up of hair loss, diagnosed as alopecia universalis.     Today, he notes his scalp turned red and \"was hot\" and appeared like sunburn after starting his prednisone course, then stopped after 4-5 days. He admits that he \"feels like he's sweating\" but isn't, may be linked to his thyroid medication. Has has tolerated the  prednisone taper well but with little clinical response.     - Last seen in-clinic on 9/27/22    Patient is otherwise feeling well, in usual state of health.        Labs:  None reviewed.    Physical Exam:  Vitals: BP (!) 154/90   Pulse 66   GEN: Well developed, well-nourished, in no acute distress, in a pleasant mood.    SKIN: Focused examination of scalp,face,  fingernails was performed.  - Fine vellus fivers on the vertex down to mid occipital scalp, persisting from prior with more present on today's examination  - Eyebrows not present.  - Eyelashes not present.  - Periungual erythema is less prominent  - may be the result of the oral prednisone treatment    Medications:  Current Outpatient Medications   Medication     aspirin (ASA) 81 MG EC tablet     atorvastatin (LIPITOR) 40 MG tablet     Baricitinib 4 MG TABS     cholecalciferol (VITAMIN D3) 25 mcg (1000 units) capsule     levothyroxine (SYNTHROID/LEVOTHROID) 25 MCG tablet     predniSONE (DELTASONE) 10 MG tablet     tofacitinib (XELJANZ) 5 MG tablet     UNABLE TO FIND     No current facility-administered medications for this visit.      Past Medical History:   There is no problem list on file for this patient.    No past medical history on file.    CC No referring provider defined for this encounter. on close of this encounter.       Again, thank you for allowing me to participate in the care of your patient.        Sincerely,        Chanel Wadsworth" MD Frank

## 2022-10-27 ENCOUNTER — MYC MEDICAL ADVICE (OUTPATIENT)
Dept: DERMATOLOGY | Facility: CLINIC | Age: 72
End: 2022-10-27

## 2022-10-29 ENCOUNTER — MYC MEDICAL ADVICE (OUTPATIENT)
Dept: DERMATOLOGY | Facility: CLINIC | Age: 72
End: 2022-10-29

## 2022-10-31 RX ORDER — MINOXIDIL 2.5 MG/1
TABLET ORAL
Qty: 90 TABLET | Refills: 1 | Status: SHIPPED | OUTPATIENT
Start: 2022-10-31 | End: 2023-01-03

## 2022-11-02 NOTE — TELEPHONE ENCOUNTER
"A&P from 10/25 visit:    \"- Revised prednisone taper: 20 mg alternating with 15 mg every other day for 1 week, then 20 mg alternating with 10 mg for 1 week, then 20 mg alternating with 5 mg for 1 week, then 20 mg every other day until next visit\"    José Luis Pablo, EMT    "

## 2022-11-02 NOTE — TELEPHONE ENCOUNTER
Patient sent in another message regarding prednisone taper. Please see other mychart encounter for more information. Closing this encounter.    José Luis Pablo, EMT

## 2023-01-03 ENCOUNTER — TELEPHONE (OUTPATIENT)
Dept: DERMATOLOGY | Facility: CLINIC | Age: 73
End: 2023-01-03

## 2023-01-03 ENCOUNTER — OFFICE VISIT (OUTPATIENT)
Dept: DERMATOLOGY | Facility: CLINIC | Age: 73
End: 2023-01-03
Payer: MEDICARE

## 2023-01-03 VITALS — HEART RATE: 77 BPM | OXYGEN SATURATION: 96 % | SYSTOLIC BLOOD PRESSURE: 162 MMHG | DIASTOLIC BLOOD PRESSURE: 91 MMHG

## 2023-01-03 DIAGNOSIS — L63.1 ALOPECIA UNIVERSALIS: Primary | ICD-10-CM

## 2023-01-03 DIAGNOSIS — L65.9 LOSS OF HAIR: ICD-10-CM

## 2023-01-03 DIAGNOSIS — L60.3 ONYCHODYSTROPHY: ICD-10-CM

## 2023-01-03 DIAGNOSIS — L30.9 DERMATITIS: ICD-10-CM

## 2023-01-03 PROCEDURE — 99214 OFFICE O/P EST MOD 30 MIN: CPT | Performed by: DERMATOLOGY

## 2023-01-03 RX ORDER — CLOBETASOL PROPIONATE 0.5 MG/G
OINTMENT TOPICAL
Qty: 45 G | Refills: 3 | Status: SHIPPED | OUTPATIENT
Start: 2023-01-03 | End: 2024-06-09

## 2023-01-03 RX ORDER — MINOXIDIL 2.5 MG/1
TABLET ORAL
Qty: 90 TABLET | Refills: 3 | Status: SHIPPED | OUTPATIENT
Start: 2023-01-03 | End: 2023-10-10

## 2023-01-03 ASSESSMENT — PAIN SCALES - GENERAL: PAINLEVEL: NO PAIN (0)

## 2023-01-03 NOTE — PROGRESS NOTES
"[facility] Dermatology Note  Encounter Date: Derrell 3, 2023  Office Visit     Dermatology Problem List:  1. 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 Elevated blood pressure reading on today's visit  - review with primary care and monitor  3. 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 Has been present for 13 months and started \"eveywhere\". High blood pressure today, pt notes likely due to high consumption of coffee. Pt reports he saw PCP recently for a physical with no concerns about BP. Pt reports recently development of nail dystrophy, had appeared normal about 1 month ago. Offered retrying authorization for NATALIA inhibitor, patient is agreeable. Recommended adding topical steroids to the nail matrix for further treatment.  - Nail photographs obtained.   - BP elevated on initial test, will retest today.  - Apply clobetasol 0.05% ointment to the nail matrix nightly.  - Recommended regular moisturizer use.  - Will retry authorization to baricitinib.   - Increase minoxidil to 2.5 mg daily. Begin by alternating 1.25 mg with 2.5 mg every other day as tolerating. Refilled.   - Continue Head & Shoulders shampoo daily.   - Continue prednisone 20 mg every other day.      Procedures Performed:   None.    Follow-up: 3 month(s) in-person for follow up, or earlier for new or changing lesions    Staff and Scribe:     Scribe " Disclosure:   I, Alton Kiran, am serving as a scribe to document services personally performed by this physician, Dr. Chanel Marie, based on data collection and the provider's statements to me.      Provider Disclosure:   The documentation recorded by the scribe accurately reflects the services I personally performed and the decisions made by me.    Chanel Marie MD  Professor and Chair  Department of Dermatology  [facility]: Phone: [phone], Fax:[phone]  [facility]: Phone: [phone], Fax: [phone]      ____________________________________________    CC: Hair Loss (Still taking oral minoxidil and on prednisone taper)    HPI:  Mr. Jalen Garcia is a 72 year old male who presents as a return patient for follow-up of hair loss, diagnosed as alopecia universalis.   - Last seen on 10/25/22 in clinic  - Shedding or thinning, or both: both  - Current tx: prednisone 20 mg taper, minoxidil 1.25 mg  - Scalp or hair care habits/products: shampooing daily.  no Any new medications, supplements, or products? (please list below)     no Scalp pain   no Scalp burning   no Scalp itching    no Eyebrow changes    no Eyelash changes   no Beard changes    no Other body hair changes    no Nail changes    no Additional symptoms? (please list below)     - Overall course: he reports nail dystrophy has returned.     Patient is otherwise feeling well, in usual state of health, and has no additional skin concerns today.     Labs:  CBC  and CMP  reviewed.    Physical Exam:  Vitals: BP (!) 160/96   Pulse 76   SpO2 96%   GEN: Well developed, well-nourished, in no acute distress, in a pleasant mood.    SKIN: Focused examination of scalp, face and hands was performed.  - Fine vellus and 1-3 cm scattered fibers across the scalp.  - Normal nail appearing from nail matrix.   - No other lesions of  concern on areas examined.     Medications:  Current Outpatient Medications   Medication     aspirin (ASA) 81 MG EC tablet     atorvastatin (LIPITOR) 40 MG tablet     cholecalciferol (VITAMIN D3) 25 mcg (1000 units) capsule     levothyroxine (SYNTHROID/LEVOTHROID) 25 MCG tablet     minoxidil (LONITEN) 2.5 MG tablet     predniSONE (DELTASONE) 10 MG tablet     UNABLE TO FIND     predniSONE (DELTASONE) 10 MG tablet     No current facility-administered medications for this visit.      Past Medical History:   There is no problem list on file for this patient.    No past medical history on file.    CC Referred Self, MD  No address on file on close of this encounter.

## 2023-01-03 NOTE — NURSING NOTE
Jalen Garcia's chief complaint for this visit includes:  Chief Complaint   Patient presents with     Hair Loss     Still taking oral minoxidil and on prednisone taper     PCP: Giorgio Morales    Referring Provider:  Referred Self, MD  No address on file    BP (!) 160/96   Pulse 76   SpO2 96%   No Pain (0)      No Known Allergies      Do you need any medication refills at today's visit? No    Diann Morales CMA

## 2023-01-03 NOTE — LETTER
"    1/3/2023         RE: Jalen Garcia  [address] [facility] [facility] [place] [address]        Dear Colleague,    Thank you for referring your patient, Jalen Garcia, to the [facility]. Please see a copy of my visit note below.    [facility] Dermatology Note  Encounter Date: Derrell 3, 2023  Office Visit     Dermatology Problem List:  1. 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 Elevated blood pressure reading on today's visit  - review with primary care and monitor  3. 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 Has been present for 13 months and started \"eveywhere\". High blood pressure today, pt notes likely due to high consumption of coffee. Pt reports he saw PCP recently for a physical with no concerns about BP. Pt reports recently development of nail dystrophy, had appeared normal about 1 month ago. Offered retrying authorization for NATALIA inhibitor, patient is agreeable. Recommended adding topical steroids to the nail matrix for further treatment.  - Nail photographs obtained.   - BP elevated on initial test, will retest today.  - Apply clobetasol 0.05% ointment to the nail matrix nightly.  - Recommended regular moisturizer use.  - Will retry authorization to baricitinib.   - Increase minoxidil to 2.5 mg daily. Begin by alternating 1.25 mg with 2.5 mg every other day as tolerating. " Refilled.   - Continue Head & Shoulders shampoo daily.   - Continue prednisone 20 mg every other day.      Procedures Performed:   None.    Follow-up: 3 month(s) in-person for follow up, or earlier for new or changing lesions    Staff and Scribe:     Scribe Disclosure:   I, Alton Kiran, am serving as a scribe to document services personally performed by this physician, Dr. Chanel Marie, based on data collection and the provider's statements to me.      Provider Disclosure:   The documentation recorded by the scribe accurately reflects the services I personally performed and the decisions made by me.    Chanel Marie MD  Professor and Chair  Department of Dermatology  [facility]: Phone: [phone], Fax:[phone]  [facility]: Phone: [phone], Fax: [phone]      ____________________________________________    CC: Hair Loss (Still taking oral minoxidil and on prednisone taper)    HPI:  Mr. Jalen Garcia is a 72 year old male who presents as a return patient for follow-up of hair loss, diagnosed as alopecia universalis.   - Last seen on 10/25/22 in clinic  - Shedding or thinning, or both: both  - Current tx: prednisone 20 mg taper, minoxidil 1.25 mg  - Scalp or hair care habits/products: shampooing daily.  no Any new medications, supplements, or products? (please list below)     no Scalp pain   no Scalp burning   no Scalp itching    no Eyebrow changes    no Eyelash changes   no Beard changes    no Other body hair changes    no Nail changes    no Additional symptoms? (please list below)     - Overall course: he reports nail dystrophy has returned.     Patient is otherwise feeling well, in usual state of health, and has no additional skin concerns today.     Labs:  CBC  and CMP  reviewed.    Physical Exam:  Vitals: BP (!) 160/96   Pulse 76   SpO2 96%   GEN: Well developed,  well-nourished, in no acute distress, in a pleasant mood.    SKIN: Focused examination of scalp, face and hands was performed.  - Fine vellus and 1-3 cm scattered fibers across the scalp.  - Normal nail appearing from nail matrix.   - No other lesions of concern on areas examined.     Medications:  Current Outpatient Medications   Medication     aspirin (ASA) 81 MG EC tablet     atorvastatin (LIPITOR) 40 MG tablet     cholecalciferol (VITAMIN D3) 25 mcg (1000 units) capsule     levothyroxine (SYNTHROID/LEVOTHROID) 25 MCG tablet     minoxidil (LONITEN) 2.5 MG tablet     predniSONE (DELTASONE) 10 MG tablet     UNABLE TO FIND     predniSONE (DELTASONE) 10 MG tablet     No current facility-administered medications for this visit.      Past Medical History:   There is no problem list on file for this patient.    No past medical history on file.    RENETTA Reeves MD  No address on file on close of this encounter.      Again, thank you for allowing me to participate in the care of your patient.        Sincerely,        Chanel Marie MD

## 2023-01-03 NOTE — TELEPHONE ENCOUNTER
PA Initiation    Medication: Olumiant- PA Pending  Insurance Company: [employer] - Phone [phone] Fax [phone]  Pharmacy Filling the Rx:    Filling Pharmacy Phone:    Filling Pharmacy Fax:    Start Date: 1/3/2023

## 2023-01-06 NOTE — TELEPHONE ENCOUNTER
Please advise. PA denied. Patients appeal was also denied back in September for the same rational.

## 2023-01-06 NOTE — TELEPHONE ENCOUNTER
PRIOR AUTHORIZATION DENIED    Medication: Olumiant- PA Pending    Denial Date: 1/3/2023    Denial Rational: not covered for cosmetic (hair growth)  purposes     Appeal Information fax [phone]

## 2023-01-09 NOTE — TELEPHONE ENCOUNTER
Veronica Kraft Maria Kramarczuk, MD; [facility] 3 hours ago (8:50 AM)     [place]  Good morning.   We dont have a direct contact at [place] just the main line at [phone].     Thank you   Chanel Mendoza MD Shutrop, Kayla; [facility] 20 hours ago (3:39 PM)     [facility],   We wanted to give this a try. In 2023 - to no effect.  Do we have a contact at [place] who could connect with him?   Regards,

## 2023-05-02 ENCOUNTER — OFFICE VISIT (OUTPATIENT)
Dept: DERMATOLOGY | Facility: CLINIC | Age: 73
End: 2023-05-02
Payer: MEDICARE

## 2023-05-02 VITALS — SYSTOLIC BLOOD PRESSURE: 144 MMHG | HEART RATE: 74 BPM | OXYGEN SATURATION: 95 % | DIASTOLIC BLOOD PRESSURE: 80 MMHG

## 2023-05-02 DIAGNOSIS — L60.3 ONYCHODYSTROPHY: ICD-10-CM

## 2023-05-02 DIAGNOSIS — M35.9 AUTOIMMUNE DISEASE (H): ICD-10-CM

## 2023-05-02 DIAGNOSIS — L63.1 ALOPECIA UNIVERSALIS: Primary | ICD-10-CM

## 2023-05-02 PROCEDURE — 99213 OFFICE O/P EST LOW 20 MIN: CPT | Performed by: DERMATOLOGY

## 2023-05-02 NOTE — PROGRESS NOTES
"[facility] Dermatology Note  Encounter Date: May 2, 2023  Office Visit     Dermatology Problem List:  1. 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 Elevated blood pressure reading on today's visit  - review with primary care and monitor  3. 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 Has been present for 13 months and started \"eveywhere\".   Pt reports resolution of nail symptoms with clobetasol and a trip to [place]. He reports he has not shaved in about 1 month, and notes only minimal facial hair growth now, decreased from several months ago.  Baricitinib is still not covered by insurance due to age. Recommended following with regional [employer] representative about enrollment in a free-drug program.   - Nail photographs reviewed.   - BP elevated on today, though this appears to be consistent.   - Okay to apply clobetasol 0.05% ointment to the scalp on days not taking prednisone, apply 5x weekly when prednisone taper ends.  - Recommended regular moisturizer use on the nails.   - Continue minoxidil to 2.5 mg daily.  - Continue Head & Shoulders shampoo daily.   - Decrease prednisone to 10 mg every other day x 2 weeks, then 5 mg every other day x 2 weeks, then discontinue.     Procedures Performed:   None.    Follow-up: 3 month(s) in-person for follow up, or earlier for new or changing lesions    Staff and Scribe: "     Scribe Disclosure:   I, Alton Kiran, am serving as a scribe to document services personally performed by this physician, Dr. Chanel Marie, based on data collection and the provider's statements to me.      Provider Disclosure:   The documentation recorded by the scribe accurately reflects the services I personally performed and the decisions made by me.    Chanel Marie MD  Professor and Chair  Department of Dermatology  [facility]: Phone: [phone], Fax:[phone]      ____________________________________________    CC: Hair Loss (Patient here for Hair loss, hair loss is not growing )    HPI:  Mr. Jalen Garcia is a 72 year old male who presents as a return patient for follow-up of hair loss, diagnosed as alopecia areata universalis.   - Last seen on 1/3/23 in clinic  - Current tx: clobetasol 0.05% ointment nightly, minoxidil 2.5 mg daily, prednisone 20 mg every other day, Head & Shoulders shampoo daily.   no Any new medications, supplements, or products? (please list below)     no Scalp pain   no Scalp burning   no Scalp itching    no Eyebrow changes    no Eyelash changes   no Beard changes    no Other body hair changes    no Nail changes    no Additional symptoms? (please list below)     - Overall course: He reports the clobetasol he was using on the nails was very helpful, but when he went to [place], his nails completely resolved in about 8 days.     Patient is otherwise feeling well, in usual state of health, and has no additional skin concerns today.     Labs:  None reviewed.    Physical Exam:  Vitals: BP (!) 144/80   Pulse 74   SpO2 95%   GEN: Well developed, well-nourished, in no acute distress, in a pleasant mood.    SKIN: Focused examination of scalp was performed.  SCALP -  - Scattered terminal fibers along the scalp.  - Fine vellus 1-2 mm fibers diffusely scattered on the mid scalp, right scalp, and left  scalp, and less diffuse on the occipital scalp.   - Scattered hairs by the left lower lip.   - no diffuse erythema   - no perifollicular erythema  - no perifollicular scale   - no scaling of the scalp   - no scalp folliculitis/pustules     FACE -  - normal eyelash density  - normal eyebrow density  HANDS -  - no nail pitting or dystrophy     - No other lesions of concern on areas examined.       Medications:  Current Outpatient Medications   Medication    aspirin (ASA) 81 MG EC tablet    atorvastatin (LIPITOR) 40 MG tablet    Baricitinib 4 MG TABS    cholecalciferol (VITAMIN D3) 25 mcg (1000 units) capsule    clobetasol (TEMOVATE) 0.05 % external ointment    levothyroxine (SYNTHROID/LEVOTHROID) 25 MCG tablet    minoxidil (LONITEN) 2.5 MG tablet    predniSONE (DELTASONE) 10 MG tablet    UNABLE TO FIND     No current facility-administered medications for this visit.      Past Medical History:   There is no problem list on file for this patient.    No past medical history on file.    CC No referring provider defined for this encounter. on close of this encounter.

## 2023-05-02 NOTE — NURSING NOTE
Jalen Garcia's goals for this visit include:   Chief Complaint   Patient presents with     Hair Loss     Patient here for Hair loss, hair loss is not growing        He requests these members of his care team be copied on today's visit information:    PCP: Giorgio Morales    Referring Provider:  No referring provider defined for this encounter.    There were no vitals taken for this visit.    Do you need any medication refills at today's visit? No         Joann Friend EMT

## 2023-05-02 NOTE — LETTER
"    5/2/2023         RE: Jalen Garcia  [address] [facility] [address]        Dear Colleague,    Thank you for referring your patient, Jalen Garcia, to the [facility]. Please see a copy of my visit note below.    [facility] Dermatology Note  Encounter Date: May 2, 2023  Office Visit     Dermatology Problem List:  1. 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 Elevated blood pressure reading on today's visit  - review with primary care and monitor  3. 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 Has been present for 13 months and started \"eveywhere\".   Pt reports resolution of nail symptoms with clobetasol and a trip to [place]. He reports he has not shaved in about 1 month, and notes only minimal facial hair growth now, decreased from several months ago.  Baricitinib is still not covered by insurance due to age. Recommended following with regional [employer] representative about enrollment in a free-drug program.   - Nail photographs reviewed.   - BP elevated on today, though this appears to be consistent.   - Okay to apply clobetasol 0.05% ointment to the scalp on days not taking prednisone, apply 5x weekly when prednisone taper ends.  - Recommended regular moisturizer use on the nails.   - Continue minoxidil to 2.5 mg daily.  - Continue Head & Shoulders shampoo daily.   - " Decrease prednisone to 10 mg every other day x 2 weeks, then 5 mg every other day x 2 weeks, then discontinue.     Procedures Performed:   None.    Follow-up: 3 month(s) in-person for follow up, or earlier for new or changing lesions    Staff and Scribe:     Scribe Disclosure:   I, Alton Kiran, am serving as a scribe to document services personally performed by this physician, Dr. Chanel Marie, based on data collection and the provider's statements to me.      Provider Disclosure:   The documentation recorded by the scribe accurately reflects the services I personally performed and the decisions made by me.    Chanel Marie MD  Professor and Chair  Department of Dermatology  [facility]: Phone: [phone], Fax:[phone]      ____________________________________________    CC: Hair Loss (Patient here for Hair loss, hair loss is not growing )    HPI:  Mr. Jalen Garcia is a 72 year old male who presents as a return patient for follow-up of hair loss, diagnosed as alopecia areata universalis.   - Last seen on 1/3/23 in clinic  - Current tx: clobetasol 0.05% ointment nightly, minoxidil 2.5 mg daily, prednisone 20 mg every other day, Head & Shoulders shampoo daily.   no Any new medications, supplements, or products? (please list below)     no Scalp pain   no Scalp burning   no Scalp itching    no Eyebrow changes    no Eyelash changes   no Beard changes    no Other body hair changes    no Nail changes    no Additional symptoms? (please list below)     - Overall course: He reports the clobetasol he was using on the nails was very helpful, but when he went to [place], his nails completely resolved in about 8 days.     Patient is otherwise feeling well, in usual state of health, and has no additional skin concerns today.     Labs:  None reviewed.    Physical Exam:  Vitals: BP (!) 144/80   Pulse 74   SpO2 95%   GEN: Well developed,  well-nourished, in no acute distress, in a pleasant mood.    SKIN: Focused examination of scalp was performed.  SCALP -  - Scattered terminal fibers along the scalp.  - Fine vellus 1-2 mm fibers diffusely scattered on the mid scalp, right scalp, and left scalp, and less diffuse on the occipital scalp.   - Scattered hairs by the left lower lip.   - no diffuse erythema   - no perifollicular erythema  - no perifollicular scale   - no scaling of the scalp   - no scalp folliculitis/pustules     FACE -  - normal eyelash density  - normal eyebrow density  HANDS -  - no nail pitting or dystrophy     - No other lesions of concern on areas examined.       Medications:  Current Outpatient Medications   Medication     aspirin (ASA) 81 MG EC tablet     atorvastatin (LIPITOR) 40 MG tablet     Baricitinib 4 MG TABS     cholecalciferol (VITAMIN D3) 25 mcg (1000 units) capsule     clobetasol (TEMOVATE) 0.05 % external ointment     levothyroxine (SYNTHROID/LEVOTHROID) 25 MCG tablet     minoxidil (LONITEN) 2.5 MG tablet     predniSONE (DELTASONE) 10 MG tablet     UNABLE TO FIND     No current facility-administered medications for this visit.      Past Medical History:   There is no problem list on file for this patient.    No past medical history on file.    CC No referring provider defined for this encounter. on close of this encounter.      Again, thank you for allowing me to participate in the care of your patient.        Sincerely,        Chanel Marie MD

## 2023-10-10 ENCOUNTER — OFFICE VISIT (OUTPATIENT)
Dept: DERMATOLOGY | Facility: CLINIC | Age: 73
End: 2023-10-10
Payer: MEDICARE

## 2023-10-10 VITALS — DIASTOLIC BLOOD PRESSURE: 89 MMHG | OXYGEN SATURATION: 96 % | SYSTOLIC BLOOD PRESSURE: 158 MMHG | HEART RATE: 67 BPM

## 2023-10-10 DIAGNOSIS — L60.3 ONYCHODYSTROPHY: ICD-10-CM

## 2023-10-10 DIAGNOSIS — M35.9 AUTOIMMUNE DISEASE (H): ICD-10-CM

## 2023-10-10 DIAGNOSIS — L63.9 AA (ALOPECIA AREATA): Primary | ICD-10-CM

## 2023-10-10 PROCEDURE — 99214 OFFICE O/P EST MOD 30 MIN: CPT | Performed by: DERMATOLOGY

## 2023-10-10 ASSESSMENT — PAIN SCALES - GENERAL: PAINLEVEL: NO PAIN (0)

## 2023-10-10 NOTE — NURSING NOTE
Jalen Garcia's goals for this visit include:   Chief Complaint   Patient presents with    Hair Loss     Hair loss follow up.  Currently using clobetasol and Head and Shoulders.  Treatment has not been effective.  Has been having the symptom of feeling like sweating, but not.       He requests these members of his care team be copied on today's visit information:     PCP: Giorgio Morales    Referring Provider:  No referring provider defined for this encounter.    BP (!) 158/89 (BP Location: Left arm)   Pulse 67   SpO2 96%     Do you need any medication refills at today's visit?     Kati Larkin on 10/10/2023 at 8:50 AM

## 2023-10-10 NOTE — PROGRESS NOTES
[facility] Dermatology Note  Encounter Date: Oct 10, 2023  Office Visit     Dermatology Problem List:  1. 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 No longer using the Minoxidil 2.5mg.      2. Blood Pressure elevated on today's visit 158/89- Follow up with PCP.       ____________________________________________    Assessment & Plan:    # Alopecia areata, Non- Scarring, secondary to alopecia universalis with onychodystrophy - Discussed possible tx and medications to assist with pt needs and concerns.  - Continue Clobetasol BID as prescribed   - Start Obzelura 1.5% Cream if approved by insurance   - Can trial 1000 mcg biotin supplements OTC      # Blood Pressure- elevated at 158/89 today and 144/80 at a prior visit   - Follow up with PCP for elevated BP    Procedures Performed:   N/A      Follow-up: 5 month(s) in-person, or earlier for new or changing problems.     Staff and Scribe:     Scribe Disclosure:   I, MAUREEN MCNAIR, am serving as a scribe; to document services personally performed by Chanel Marie MD -based on data collection and the provider's statements to me.     Provider Disclosure:   The documentation recorded by the scribe accurately reflects the services I personally performed and the decisions made by me.    Chanel Marie MD  Professor and Chair  Department of Dermatology  [facility]: Phone: [phone], Fax:[phone]  [facility]: Phone: [phone], Fax:  [phone]        ____________________________________________    CC: Hair Loss (Hair loss follow up.  Currently using clobetasol and Head and Shoulders.  Treatment has not been effective.  Has been having the symptom of feeling like sweating, but not. Needs med refills. )    HPI:  Mr. Jalen Garcia is a(n) 73 year old male who presents today as a return patient for hair loss.     Pt feels like he is sweating over his total body but is not truly sweating.  - will need to monitor this symptom  He reports itching and that the scalp feels warmer. He does not report any new medications.     Jalen will be getting his Covid Injections on Friday.     Patient is otherwise feeling well, without additional skin concerns.    Labs Reviewed:  N/A    Physical Exam:  Vitals: BP (!) 158/89 (BP Location: Left arm)   Pulse 67   SpO2 96%   SKIN: Focused examination of scalp, face and nails  was performed.  - Nail Matrix Improved in most affected digits suggesting the topical steroid is helping.  - Mild periungual erythema  - Scalp still significantly devoid of hair - see photos, same with eyebrow and eyelash regions  - No other lesions of concern on areas examined.     Medications:  Current Outpatient Medications   Medication    aspirin (ASA) 81 MG EC tablet    atorvastatin (LIPITOR) 40 MG tablet    clobetasol (TEMOVATE) 0.05 % external ointment    levothyroxine (SYNTHROID/LEVOTHROID) 25 MCG tablet    Baricitinib 4 MG TABS    cholecalciferol (VITAMIN D3) 25 mcg (1000 units) capsule    minoxidil (LONITEN) 2.5 MG tablet    predniSONE (DELTASONE) 10 MG tablet    UNABLE TO FIND     No current facility-administered medications for this visit.      Past Medical History:   There is no problem list on file for this patient.    No past medical history on file.     CC No referring provider defined for this encounter. on close of this encounter.

## 2023-10-10 NOTE — Clinical Note
10/10/2023         RE: Jalen Garcia  [address]  [place] [address]        Dear Colleague,    Thank you for referring your patient, Jalen Garcia, to the [facility]. Please see a copy of my visit note below.    [facility] Dermatology Note  Encounter Date: Oct 10, 2023  Office Visit     Dermatology Problem List:  1. 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     - Prior tx: SADBE contact sensitization q weekly (12/7/21), trial of ILK 10, unable to get an oral FDA approved NATALIA for alopecia areata covered and income too high for xelsource. No longer using the Minoxidil 2.5mg.      2. Blood Pressure elevated on today's visit 158/89- Follow up with PCP.       ____________________________________________    Assessment & Plan:    # Alopecia areata, Non- Scarring, secondary to alopecia universalis with onychodystrophy - - -Discussed possible tx and medications to assist with pt needs and concerns.  - Clobetasol 2x BID.   - Obzelura 1.5% Cream 1x BID.     # Blood Pressure- elevated  - Follow up with PCP for elevated BP    Procedures Performed:   N/A      Follow-up: 5 month(s) in-person, or earlier for new or changing problems.     Staff and Scribe:     Scribe Disclosure:   I, MAUREEN MCNAIR, am serving as a scribe; to document services personally performed by Chanel Marie MD -based on data collection and the provider's statements to me.       ***   ____________________________________________    CC: Hair Loss (Hair loss follow up.  Currently using clobetasol and Head and Shoulders.  Treatment has not been effective.  Has been having the symptom of feeling like sweating, but not. Needs med refills. )    HPI:  MrAmanda Rao JOHANA Garcia is a(n) 73 year old male who  presents today as a return patient for hair loss.     Pt feels like he is sweating over his total body but is not sweating.   He reports itching and that it feels warmer. He does not report any new medications.     Jalen will be getting his Covid Injections on Friday.     Patient is otherwise feeling well, without additional skin concerns.    Labs Reviewed:  ***N/A    Physical Exam:  Vitals: BP (!) 158/89 (BP Location: Left arm)   Pulse 67   SpO2 96%   SKIN: Focused examination of scalp and nails  was performed.  - Pinkness around the nail.   - Nail Matrix Improved   - {Skin Exam Derm:774143}.   - {Skin Exam Derm:278946}.   - {Skin Exam Derm:060284}.   - No other lesions of concern on areas examined.     Medications:  Current Outpatient Medications   Medication    aspirin (ASA) 81 MG EC tablet    atorvastatin (LIPITOR) 40 MG tablet    clobetasol (TEMOVATE) 0.05 % external ointment    levothyroxine (SYNTHROID/LEVOTHROID) 25 MCG tablet    Baricitinib 4 MG TABS    cholecalciferol (VITAMIN D3) 25 mcg (1000 units) capsule    minoxidil (LONITEN) 2.5 MG tablet    predniSONE (DELTASONE) 10 MG tablet    UNABLE TO FIND     No current facility-administered medications for this visit.      Past Medical History:   There is no problem list on file for this patient.    No past medical history on file.     CC No referring provider defined for this encounter. on close of this encounter.       Again, thank you for allowing me to participate in the care of your patient.        Sincerely,        Chanel Marie MD   Immune Suppressed

## 2023-11-06 RX ORDER — RUXOLITINIB 15 MG/G
5 CREAM TOPICAL DAILY
Qty: 15 G | Refills: 3 | Status: SHIPPED | OUTPATIENT
Start: 2023-11-06 | End: 2024-06-09

## 2024-03-09 ENCOUNTER — HEALTH MAINTENANCE LETTER (OUTPATIENT)
Age: 74
End: 2024-03-09

## 2024-03-19 ENCOUNTER — OFFICE VISIT (OUTPATIENT)
Dept: DERMATOLOGY | Facility: CLINIC | Age: 74
End: 2024-03-19
Payer: MEDICARE

## 2024-03-19 VITALS — HEART RATE: 67 BPM | SYSTOLIC BLOOD PRESSURE: 154 MMHG | DIASTOLIC BLOOD PRESSURE: 100 MMHG | OXYGEN SATURATION: 95 %

## 2024-03-19 DIAGNOSIS — L60.3 ONYCHODYSTROPHY: ICD-10-CM

## 2024-03-19 DIAGNOSIS — M35.9 AUTOIMMUNE DISEASE (H): ICD-10-CM

## 2024-03-19 DIAGNOSIS — L63.9 AA (ALOPECIA AREATA): ICD-10-CM

## 2024-03-19 DIAGNOSIS — B35.1 ONYCHOMYCOSIS: Primary | ICD-10-CM

## 2024-03-19 PROCEDURE — 99214 OFFICE O/P EST MOD 30 MIN: CPT | Mod: 25 | Performed by: DERMATOLOGY

## 2024-03-19 PROCEDURE — 87107 FUNGI IDENTIFICATION MOLD: CPT | Mod: XU | Performed by: DERMATOLOGY

## 2024-03-19 PROCEDURE — 87101 SKIN FUNGI CULTURE: CPT | Performed by: DERMATOLOGY

## 2024-03-19 PROCEDURE — 87106 FUNGI IDENTIFICATION YEAST: CPT | Performed by: DERMATOLOGY

## 2024-03-19 PROCEDURE — 11901 INJECT SKIN LESIONS >7: CPT | Performed by: DERMATOLOGY

## 2024-03-19 ASSESSMENT — PAIN SCALES - GENERAL: PAINLEVEL: NO PAIN (0)

## 2024-03-19 NOTE — LETTER
3/19/2024         RE: [facility] [place]  [address] [facility] [address]        Dear Colleague,    Thank you for referring your patient, Jalen Garcia, to the [facility]. Please see a copy of my visit note below.    [facility] Dermatology Note  Encounter Date: Mar 19, 2024  Office Visit      Dermatology Problem List:  1.  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 No longer using the Minoxidil 2.5mg.  .    _______________________________________    Assessment & Plan:     # Alopecia areata, Non- Scarring, secondary to alopecia universalis with onychodystrophy - Discussed possible tx and medications to assist with pt needs and concerns.  - Continue Clobetasol BID as prescribed   - Obzelura 1.5% Cream was not approved by insurance   - Trialed 1000 mcg biotin supplements OTC  but reports it was not that helpful  - will consider clinical trial- will have Cody reach out to patient  - ILK injection today      #Onychodystrophy - most likely related to alopecia areata but will rule out fungal component; nail clipping obtained and sent for culture/exam    # Blood Pressure- today 154/100, elevated at prior visit 158/89   - Follow up with PCP for elevated BP  - Nail clipping done today to send in for nail culture   - photos taken of nails today        Procedures Performed:   - Intra-lesional triamcinolone procedure note. After verbal consent and review of risk of pain and skin thinning/atrophy, positioning and cleansing with isopropyl alcohol, 1 cc total mL  of triamcinolone 10 mg/mL was injected into 10 lesion(s) on the scalp. The patient tolerated the procedure well and left the dermatology clinic in good condition.    Follow-up: 3 month(s) in-person, or earlier for new or changing lesions    Staff and Scribe:     Scribe Disclosure:   I, Day Lundy, am serving as a scribe; to document services personally performed by Chanel Marie MD -based on data collection and the provider's statements to me.     Provider Disclosure:  I agree with above History, Review of Systems, Physical exam and Plan.  I have reviewed the content of the documentation and have edited it as needed. I have personally performed the services documented here and the documentation accurately represents those services and the decisions I have made.   ILK injections were done by me.    Electronically signed by:  Chanel Marie MD  Professor and Chair  Department of Dermatology  [facility]     ____________________________________________    CC: Hair Loss (Alopecia areata )    HPI:  Mr. Jalen Garcia is a 73 year old male who presents as a return patient for follow-up of alopecia universalis with onychodystrophy.   - Last seen 10/10/2023 in-clinic by me.   Today, he is not using clobetasol to the nails, he feels it did not help. He states that the nails do still hurt. He did not get the obzelura due to cost. He tried the biotin, but did not see any improvement with the nails. His blood pressure is slightly elevated today 154/100. He denies any new medication.     - Current tx: clobetasol twice a day, obzelura 1.5% cream (if approved by insurance).  No Any new medications, supplements, or products? (please list below)     No Scalp pain   No Scalp burning   No Scalp itching    No Eyebrow changes    No Eyelash changes   No Beard changes    No Other body hair changes    No Nail changes    No Additional symptoms? (please list below)       Patient is otherwise feeling well,  in usual state of health, and has no additional skin concerns today.     Labs:  None reviewed.    Physical Exam:  Vitals: BP (!) 154/100   Pulse 67   SpO2 95%   GEN: Well developed, well-nourished, in no acute distress, in a pleasant mood.    SKIN: Focused examination of fingernails was performed.  - fine vellus fibers hair on the ears  - vellus fibers between the brows  - sparse eyelash density  - sparse eyebrow density  - nail pitting or dystrophy- left hand: all nails show matrix pinkness and erythema. Left thumbnail shows irregular shape.  - Nails show pitting and longitudinal ridging on the left nails  - Right nails vertical ridging and less pinkness and thumb nail irregular  - No other lesions of concern on areas examined.       Medications:  Current Outpatient Medications   Medication     aspirin (ASA) 81 MG EC tablet     atorvastatin (LIPITOR) 40 MG tablet     levothyroxine (SYNTHROID/LEVOTHROID) 25 MCG tablet     clobetasol (TEMOVATE) 0.05 % external ointment     Ruxolitinib Phosphate (OPZELURA) 1.5 % CREA     No current facility-administered medications for this visit.      Past Medical History:   There is no problem list on file for this patient.    No past medical history on file.    CC No referring provider defined for this encounter. on close of this encounter.       Again, thank you for allowing me to participate in the care of your patient.        Sincerely,        Chanel Marie MD

## 2024-03-19 NOTE — NURSING NOTE
Jalen Garcia's chief complaint for this visit includes:  Chief Complaint   Patient presents with    Hair Loss     Alopecia areata      PCP: Giorgio Morales    Referring Provider:  Chanel Marie MD  [address] [place] [id number]    There were no vitals taken for this visit.  Data Unavailable      No Known Allergies      Do you need any medication refills at today's visit? No     Diann Morales CMA

## 2024-03-19 NOTE — PROGRESS NOTES
[facility] Dermatology Note  Encounter Date: Mar 19, 2024  Office Visit      Dermatology Problem List:  1.  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 No longer using the Minoxidil 2.5mg.  .    _______________________________________    Assessment & Plan:     # Alopecia areata, Non- Scarring, secondary to alopecia universalis with onychodystrophy - Discussed possible tx and medications to assist with pt needs and concerns.  - Continue Clobetasol BID as prescribed   - Obzelura 1.5% Cream was not approved by insurance   - Trialed 1000 mcg biotin supplements OTC  but reports it was not that helpful  - will consider clinical trial- will have Cody reach out to patient  - ILK injection today      #Onychodystrophy - most likely related to alopecia areata but will rule out fungal component; nail clipping obtained and sent for culture/exam    # Blood Pressure- today 154/100, elevated at prior visit 158/89   - Follow up with PCP for elevated BP  - Nail clipping done today to send in for nail culture   - photos taken of nails today        Procedures Performed:   - Intra-lesional triamcinolone procedure note. After verbal consent and review of risk of pain and skin thinning/atrophy, positioning and cleansing with isopropyl alcohol, 1 cc total mL of triamcinolone 10 mg/mL was injected into 10 lesion(s) on the scalp. The patient tolerated the procedure well and left the dermatology clinic in good condition.    Follow-up: 3 month(s) in-person, or earlier for new or changing lesions    Staff and Scribe:      Scribe Disclosure:   I, Day Lundy, am serving as a scribe; to document services personally performed by Chanel Marie MD -based on data collection and the provider's statements to me.     Provider Disclosure:  I agree with above History, Review of Systems, Physical exam and Plan.  I have reviewed the content of the documentation and have edited it as needed. I have personally performed the services documented here and the documentation accurately represents those services and the decisions I have made.   ILK injections were done by me.    Electronically signed by:  Chanel Marie MD  Professor and Chair  Department of Dermatology  [facility]     ____________________________________________    CC: Hair Loss (Alopecia areata )    HPI:  Mr. Jalen Garcia is a 73 year old male who presents as a return patient for follow-up of alopecia universalis with onychodystrophy.   - Last seen 10/10/2023 in-clinic by me.   Today, he is not using clobetasol to the nails, he feels it did not help. He states that the nails do still hurt. He did not get the obzelura due to cost. He tried the biotin, but did not see any improvement with the nails. His blood pressure is slightly elevated today 154/100. He denies any new medication.     - Current tx: clobetasol twice a day, obzelura 1.5% cream (if approved by insurance).  No Any new medications, supplements, or products? (please list below)     No Scalp pain   No Scalp burning   No Scalp itching    No Eyebrow changes    No Eyelash changes   No Beard changes    No Other body hair changes    No Nail changes    No Additional symptoms? (please list below)       Patient is otherwise feeling well, in usual state of health, and has no additional skin concerns today.     Labs:  None reviewed.    Physical Exam:  Vitals: BP (!) 154/100   Pulse 67   SpO2 95%   GEN: Well developed, well-nourished, in no acute distress, in a pleasant mood.    SKIN: Focused  examination of fingernails was performed.  - fine vellus fibers hair on the ears  - vellus fibers between the brows  - sparse eyelash density  - sparse eyebrow density  - nail pitting or dystrophy- left hand: all nails show matrix pinkness and erythema. Left thumbnail shows irregular shape.  - Nails show pitting and longitudinal ridging on the left nails  - Right nails vertical ridging and less pinkness and thumb nail irregular  - No other lesions of concern on areas examined.       Medications:  Current Outpatient Medications   Medication    aspirin (ASA) 81 MG EC tablet    atorvastatin (LIPITOR) 40 MG tablet    levothyroxine (SYNTHROID/LEVOTHROID) 25 MCG tablet    clobetasol (TEMOVATE) 0.05 % external ointment    Ruxolitinib Phosphate (OPZELURA) 1.5 % CREA     No current facility-administered medications for this visit.      Past Medical History:   There is no problem list on file for this patient.    No past medical history on file.    CC No referring provider defined for this encounter. on close of this encounter.

## 2024-04-16 LAB
BACTERIA SPEC CULT: ABNORMAL
BACTERIA SPEC CULT: ABNORMAL

## 2024-06-04 ENCOUNTER — OFFICE VISIT (OUTPATIENT)
Dept: DERMATOLOGY | Facility: CLINIC | Age: 74
End: 2024-06-04
Payer: MEDICARE

## 2024-06-04 VITALS — SYSTOLIC BLOOD PRESSURE: 133 MMHG | HEART RATE: 69 BPM | DIASTOLIC BLOOD PRESSURE: 89 MMHG | OXYGEN SATURATION: 93 %

## 2024-06-04 DIAGNOSIS — L60.3 ONYCHODYSTROPHY: ICD-10-CM

## 2024-06-04 DIAGNOSIS — M35.9 AUTOIMMUNE DISEASE (H): ICD-10-CM

## 2024-06-04 DIAGNOSIS — B35.1 ONYCHOMYCOSIS: ICD-10-CM

## 2024-06-04 DIAGNOSIS — L63.9 AA (ALOPECIA AREATA): Primary | ICD-10-CM

## 2024-06-04 PROCEDURE — 99213 OFFICE O/P EST LOW 20 MIN: CPT | Performed by: DERMATOLOGY

## 2024-06-04 NOTE — PROGRESS NOTES
[facility] Dermatology Note  Encounter Date: Jun 4, 2024  Office Visit       Dermatology Problem List:  1.  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 No longer using the Minoxidil 2.5mg..     2. Onychodystrophy  -Nail clipping 3/19/2024  -     Candida parapsilosis complex isolated - interestingly, the nail disease is improving without any specific treatment        -- photos taken of nails 3/19/2024 and today 6/4/24  ____________________________________________    Assessment & Plan:     # Alopecia areata, Non- Scarring, secondary to alopecia universalis with onychodystrophy - Discussed possible tx and medications to assist with pt needs and concerns.  - Ok to use Clobetasol ointment   - Obzelura 1.5% Cream was not approved by insurance   - Trialed 1000 mcg biotin supplements OTC  but reports it was not that helpful  - Trialed ILK 10mg/ml, no significant response   - will consider clinical trial- will have Cody reach out to patient  - Plan: Dr. Marie will discuss with the clinical research division what could be the best option for clinical trials     # Blood Pressure-  154/100 (3/19/2024), elevated at prior visit 158/89   - Follow up with PCP for elevated BP  - Started atorvastatin about 4 months ago  - B/P Today:     # Onychomycosis- left thumb nail-improving without any specific therapy  -       Procedures Performed:   None      Follow-up: will follow-up after connecting with the clinical trials division    Staff and Scribe:     Scribe Disclosure:   I, Day Lundy, am  serving as a scribe; to document services personally performed by Chanel Marie MD -based on data collection and the provider's statements to me.     Provider Disclosure:  I agree with above History, Review of Systems, Physical exam and Plan.  I have reviewed the content of the documentation and have edited it as needed. I have personally performed the services documented here and the documentation accurately represents those services and the decisions I have made.      Electronically signed by:  Chanel Marie MD  Professor   Department of Dermatology  [facility]     ____________________________________________    CC: Hair Loss (Patient is here for a hair loss follow up, hair loss nothing is changed )    HPI:  Mr. Jalen Garcia is a 73 year old male who presents as a return patient for follow-up of hair loss, diagnosed as  alopecia universalis with onychodystrophy. .   - Last seen in-clinic on 3/19/2024  - Shedding or thinning, or both: none  - Current tx: prescribed clobetasol cream obzelura cream    no Any new medications, supplements, or products? (please list below)     no Scalp pain   no Scalp burning   no Scalp itching    no Eyebrow changes    no Eyelash changes   no Beard changes    no Other body hair changes    no Nail changes    no Additional symptoms? (please list below)     - Overall course: He reports his loss is stable. He has started blood pressure medication for about 4 months due to elevated blood pressure readings both in this clinic and elsewhere  Persistent itching eyes  - related to lack of protection (eyelashes) from environmental factors  Discomfort with fingernails with pressure from daily activities  Patient is otherwise feeling well, in usual state of health, and has no additional skin concerns today.       Labs:  None reviewed.    Physical Exam:  GEN: Well developed, well-nourished, in no acute distress, in a pleasant mood.    SKIN: Scalp, face and hand  exam was performed.  - In comparison to prior photographs, there is no significant scalp hair - still virtually universalis  - Eyebrow and eyelash density  - all areas with significant loss  - Compared to previous photos, fingernail abnormalities appear to be resolving with more normal proximal nail appearing - photos taken  - No other lesions of concern on areas examined.       Medications:  Current Outpatient Medications   Medication Sig Dispense Refill    aspirin (ASA) 81 MG EC tablet Take 81 mg by mouth daily      atorvastatin (LIPITOR) 40 MG tablet Take 40 mg by mouth daily      levothyroxine (SYNTHROID/LEVOTHROID) 25 MCG tablet Take 25 mcg by mouth daily      clobetasol (TEMOVATE) 0.05 % external ointment Apply a thin layer to the nail matrix nightly 6 of 7 days of the week 45 g 3    Ruxolitinib Phosphate (OPZELURA) 1.5 % CREA Externally apply 5 g topically daily 15 g 3     No current facility-administered medications for this visit.      Past Medical History:   There is no problem list on file for this patient.    No past medical history on file.    CC No referring provider defined for this encounter. on close of this encounter.

## 2024-06-04 NOTE — NURSING NOTE
Jalen Garcia's goals for this visit include:   Chief Complaint   Patient presents with    Hair Loss     Patient is here for a hair loss follow up, hair loss nothing is changed        He requests these members of his care team be copied on today's visit information:     PCP: Giorgio Morales    Referring Provider:  Referred Self, MD  No address on file    There were no vitals taken for this visit.    Do you need any medication refills at today's visit?           Joann Friend EMT

## 2024-06-04 NOTE — LETTER
6/4/2024      Jalen Garcia  [id number] [facility] [address]      Dear Colleague,    Thank you for referring your patient, Jalen Garcia, to the [facility]. Please see a copy of my visit note below.    [facility] Dermatology Note  Encounter Date: Jun 4, 2024  Office Visit       Dermatology Problem List:  1.  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 No longer using the Minoxidil 2.5mg..     2. Onychodystrophy  -Nail clipping 3/19/2024  -     Candida parapsilosis complex isolated - interestingly, the nail disease is improving without any specific treatment        -- photos taken of nails 3/19/2024 and today 6/4/24  ____________________________________________    Assessment & Plan:     # Alopecia areata, Non- Scarring, secondary to alopecia universalis with onychodystrophy - Discussed possible tx and medications to assist with pt needs and concerns.  - Ok to use Clobetasol ointment   - Obzelura 1.5% Cream was not approved by insurance   - Trialed 1000 mcg biotin supplements OTC  but reports it was not that helpful  - Trialed ILK 10mg/ml, no significant response   - will consider clinical trial- will have Cody reach out to patient  - Plan: Dr. Marie will discuss with the clinical research division what could be the best option for clinical trials     # Blood Pressure-  154/100 (3/19/2024), elevated at prior visit 158/89   - Follow up with PCP for elevated BP  - Started atorvastatin about 4 months ago  - B/P Today:     #  Onychomycosis- left thumb nail-improving without any specific therapy  -       Procedures Performed:   None      Follow-up: will follow-up after connecting with the clinical trials division    Staff and Scribe:     Scribe Disclosure:   I, Day Lundy, am serving as a scribe; to document services personally performed by Chanel Marie MD -based on data collection and the provider's statements to me.     Provider Disclosure:  I agree with above History, Review of Systems, Physical exam and Plan.  I have reviewed the content of the documentation and have edited it as needed. I have personally performed the services documented here and the documentation accurately represents those services and the decisions I have made.      Electronically signed by:  Chanel Marie MD  Professor   Department of Dermatology  [facility]     ____________________________________________    CC: Hair Loss (Patient is here for a hair loss follow up, hair loss nothing is changed )    HPI:  Mr. Jalen Garcia is a 73 year old male who presents as a return patient for follow-up of hair loss, diagnosed as  alopecia universalis with onychodystrophy. .   - Last seen in-clinic on 3/19/2024  - Shedding or thinning, or both: none  - Current tx: prescribed clobetasol cream obzelura cream    no Any new medications, supplements, or products? (please list below)     no Scalp pain   no Scalp burning   no Scalp itching    no Eyebrow changes    no Eyelash changes   no Beard changes    no Other body hair changes    no Nail changes    no Additional symptoms? (please list below)     - Overall course: He reports his loss is stable. He has started blood pressure medication for about 4 months due to elevated blood pressure readings both in this clinic and elsewhere  Persistent itching eyes  - related to lack of protection (eyelashes) from environmental factors  Discomfort with fingernails with pressure from daily  activities  Patient is otherwise feeling well, in usual state of health, and has no additional skin concerns today.       Labs:  None reviewed.    Physical Exam:  GEN: Well developed, well-nourished, in no acute distress, in a pleasant mood.    SKIN: Scalp, face and hand exam was performed.  - In comparison to prior photographs, there is no significant scalp hair - still virtually universalis  - Eyebrow and eyelash density  - all areas with significant loss  - Compared to previous photos, fingernail abnormalities appear to be resolving with more normal proximal nail appearing - photos taken  - No other lesions of concern on areas examined.       Medications:  Current Outpatient Medications   Medication Sig Dispense Refill     aspirin (ASA) 81 MG EC tablet Take 81 mg by mouth daily       atorvastatin (LIPITOR) 40 MG tablet Take 40 mg by mouth daily       levothyroxine (SYNTHROID/LEVOTHROID) 25 MCG tablet Take 25 mcg by mouth daily       clobetasol (TEMOVATE) 0.05 % external ointment Apply a thin layer to the nail matrix nightly 6 of 7 days of the week 45 g 3     Ruxolitinib Phosphate (OPZELURA) 1.5 % CREA Externally apply 5 g topically daily 15 g 3     No current facility-administered medications for this visit.      Past Medical History:   There is no problem list on file for this patient.    No past medical history on file.    CC No referring provider defined for this encounter. on close of this encounter.       Again, thank you for allowing me to participate in the care of your patient.        Sincerely,        Chanel Marie MD

## 2024-11-21 DIAGNOSIS — L63.9 AA (ALOPECIA AREATA): Primary | ICD-10-CM

## 2024-11-21 DIAGNOSIS — M35.9 AUTOIMMUNE DISEASE (H): ICD-10-CM

## 2024-11-21 DIAGNOSIS — L60.3 ONYCHODYSTROPHY: ICD-10-CM

## 2024-11-21 RX ORDER — CLOBETASOL PROPIONATE 0.5 MG/G
OINTMENT TOPICAL
Qty: 60 G | Refills: 0 | Status: SHIPPED | OUTPATIENT
Start: 2024-11-21

## 2024-11-21 NOTE — TELEPHONE ENCOUNTER
"   clobetasol (TEMOVATE) 0.05 % external ointment     Last Written Prescription Date:  1/3/23  Last Fill Quantity: 45 g ,   # refills: 3  Last Office Visit : 6/6/24 Dr Marie note:\" # Alopecia areata, Non- Scarring, secondary to alopecia universalis with onychodystrophy - Discussed possible tx and medications to assist with pt needs and concerns.  - Ok to use Clobetasol ointment \"  Future Office visit:  None    Routing refill request to provider for review/approval because:  Drug not active on patient's medication list Clobetasol 0.05% ointment         "

## 2024-11-21 NOTE — TELEPHONE ENCOUNTER
Refill request for Clobetasol 0.05% ointment from [facility] pharmacy #[id number] on [address] in [place], [place].  [facility] Rx: [id number]  Tel: [phone]  Fax: [phone]    Salvatore White on 11/21/2024 at 10:12 AM

## 2025-01-19 ENCOUNTER — HEALTH MAINTENANCE LETTER (OUTPATIENT)
Age: 75
End: 2025-01-19